# Patient Record
Sex: MALE | Race: BLACK OR AFRICAN AMERICAN | NOT HISPANIC OR LATINO | URBAN - METROPOLITAN AREA
[De-identification: names, ages, dates, MRNs, and addresses within clinical notes are randomized per-mention and may not be internally consistent; named-entity substitution may affect disease eponyms.]

---

## 2016-08-18 RX ORDER — IBUPROFEN 200 MG
1 TABLET ORAL
Qty: 21 | Refills: 0
Start: 2016-08-18 | End: 2022-06-20

## 2017-03-31 ENCOUNTER — EMERGENCY (EMERGENCY)
Facility: HOSPITAL | Age: 27
LOS: 1 days | Discharge: LEFT BEFORE DISCHARGE | End: 2017-03-31
Attending: EMERGENCY MEDICINE | Admitting: EMERGENCY MEDICINE
Payer: MEDICAID

## 2017-03-31 VITALS
SYSTOLIC BLOOD PRESSURE: 147 MMHG | DIASTOLIC BLOOD PRESSURE: 104 MMHG | OXYGEN SATURATION: 98 % | TEMPERATURE: 98 F | HEART RATE: 92 BPM | RESPIRATION RATE: 17 BRPM

## 2017-03-31 VITALS
DIASTOLIC BLOOD PRESSURE: 109 MMHG | RESPIRATION RATE: 18 BRPM | OXYGEN SATURATION: 100 % | HEART RATE: 100 BPM | SYSTOLIC BLOOD PRESSURE: 153 MMHG | TEMPERATURE: 99 F

## 2017-03-31 LAB
ALBUMIN SERPL ELPH-MCNC: 4.8 G/DL — SIGNIFICANT CHANGE UP (ref 3.3–5)
ALP SERPL-CCNC: 72 U/L — SIGNIFICANT CHANGE UP (ref 40–120)
ALT FLD-CCNC: 33 U/L — SIGNIFICANT CHANGE UP (ref 4–41)
AST SERPL-CCNC: 48 U/L — HIGH (ref 4–40)
BASOPHILS # BLD AUTO: 0.01 K/UL — SIGNIFICANT CHANGE UP (ref 0–0.2)
BASOPHILS NFR BLD AUTO: 0.1 % — SIGNIFICANT CHANGE UP (ref 0–2)
BILIRUB SERPL-MCNC: 0.2 MG/DL — SIGNIFICANT CHANGE UP (ref 0.2–1.2)
BUN SERPL-MCNC: 9 MG/DL — SIGNIFICANT CHANGE UP (ref 7–23)
CALCIUM SERPL-MCNC: 9.5 MG/DL — SIGNIFICANT CHANGE UP (ref 8.4–10.5)
CHLORIDE SERPL-SCNC: 101 MMOL/L — SIGNIFICANT CHANGE UP (ref 98–107)
CO2 SERPL-SCNC: 27 MMOL/L — SIGNIFICANT CHANGE UP (ref 22–31)
CREAT SERPL-MCNC: 0.9 MG/DL — SIGNIFICANT CHANGE UP (ref 0.5–1.3)
EOSINOPHIL # BLD AUTO: 0.06 K/UL — SIGNIFICANT CHANGE UP (ref 0–0.5)
EOSINOPHIL NFR BLD AUTO: 0.8 % — SIGNIFICANT CHANGE UP (ref 0–6)
GLUCOSE SERPL-MCNC: 95 MG/DL — SIGNIFICANT CHANGE UP (ref 70–99)
HCT VFR BLD CALC: 39.9 % — SIGNIFICANT CHANGE UP (ref 39–50)
HGB BLD-MCNC: 12.9 G/DL — LOW (ref 13–17)
IMM GRANULOCYTES NFR BLD AUTO: 0.3 % — SIGNIFICANT CHANGE UP (ref 0–1.5)
LYMPHOCYTES # BLD AUTO: 2.7 K/UL — SIGNIFICANT CHANGE UP (ref 1–3.3)
LYMPHOCYTES # BLD AUTO: 37.7 % — SIGNIFICANT CHANGE UP (ref 13–44)
MCHC RBC-ENTMCNC: 25.2 PG — LOW (ref 27–34)
MCHC RBC-ENTMCNC: 32.3 % — SIGNIFICANT CHANGE UP (ref 32–36)
MCV RBC AUTO: 78.1 FL — LOW (ref 80–100)
MONOCYTES # BLD AUTO: 0.32 K/UL — SIGNIFICANT CHANGE UP (ref 0–0.9)
MONOCYTES NFR BLD AUTO: 4.5 % — SIGNIFICANT CHANGE UP (ref 2–14)
NEUTROPHILS # BLD AUTO: 4.05 K/UL — SIGNIFICANT CHANGE UP (ref 1.8–7.4)
NEUTROPHILS NFR BLD AUTO: 56.6 % — SIGNIFICANT CHANGE UP (ref 43–77)
PLATELET # BLD AUTO: 273 K/UL — SIGNIFICANT CHANGE UP (ref 150–400)
PMV BLD: 9.3 FL — SIGNIFICANT CHANGE UP (ref 7–13)
POTASSIUM SERPL-MCNC: 4.2 MMOL/L — SIGNIFICANT CHANGE UP (ref 3.5–5.3)
POTASSIUM SERPL-SCNC: 4.2 MMOL/L — SIGNIFICANT CHANGE UP (ref 3.5–5.3)
PROT SERPL-MCNC: 8.1 G/DL — SIGNIFICANT CHANGE UP (ref 6–8.3)
RBC # BLD: 5.11 M/UL — SIGNIFICANT CHANGE UP (ref 4.2–5.8)
RBC # FLD: 14.1 % — SIGNIFICANT CHANGE UP (ref 10.3–14.5)
SODIUM SERPL-SCNC: 141 MMOL/L — SIGNIFICANT CHANGE UP (ref 135–145)
WBC # BLD: 7.16 K/UL — SIGNIFICANT CHANGE UP (ref 3.8–10.5)
WBC # FLD AUTO: 7.16 K/UL — SIGNIFICANT CHANGE UP (ref 3.8–10.5)

## 2017-03-31 PROCEDURE — 99284 EMERGENCY DEPT VISIT MOD MDM: CPT | Mod: 25

## 2017-03-31 NOTE — ED ADULT TRIAGE NOTE - CHIEF COMPLAINT QUOTE
Pt states he has been experiencing chest pain accompanied by N/V/D for 2 years, states he "cannot take it anymore".  Pt states he drinks "a lot of alcohol 4 times a week", last drink 1130 this AM.

## 2017-03-31 NOTE — ED ADULT NURSE NOTE - OBJECTIVE STATEMENT
pt on bed aox4 ambualtory c/o left sided chest pain radiates to left shoulder, top head abd left mid umbilical area for 2 years on and off worse after drinking alcohol. also reports diarrhea multiple episode, vomiting x1 and nausea denies Dizziness SOB palpitation. claimed drinking alcohol x4 a week on cm sinus rhythm waiting fro DM to eval will monitor

## 2017-04-01 LAB — LIDOCAIN IGE QN: 55.6 U/L — SIGNIFICANT CHANGE UP (ref 7–60)

## 2017-04-01 RX ORDER — METOCLOPRAMIDE HCL 10 MG
10 TABLET ORAL ONCE
Qty: 0 | Refills: 0 | Status: COMPLETED | OUTPATIENT
Start: 2017-04-01 | End: 2017-04-01

## 2017-04-01 RX ORDER — SODIUM CHLORIDE 9 MG/ML
1000 INJECTION INTRAMUSCULAR; INTRAVENOUS; SUBCUTANEOUS ONCE
Qty: 0 | Refills: 0 | Status: COMPLETED | OUTPATIENT
Start: 2017-04-01 | End: 2017-04-01

## 2017-04-01 RX ADMIN — SODIUM CHLORIDE 1000 MILLILITER(S): 9 INJECTION INTRAMUSCULAR; INTRAVENOUS; SUBCUTANEOUS at 00:27

## 2017-04-01 RX ADMIN — Medication 10 MILLIGRAM(S): at 00:27

## 2017-04-01 NOTE — ED PROVIDER NOTE - CARE PLAN
Instructions for follow-up, activity and diet:	You were seen in the Emergency Department for abdominal pain, nausea, vomiting, chest pain and headache. Your symptoms are being evaluated with bloodwork and imaging (radiology studies). You have decided to leave the hospital Against Medical Advice (AMA). This means that your workup and evaluation are not yet complete. In doing so, you are acknowledging that your evaluation is not yet complete and serious medical conditions have not been fully treated or ruled out yet; leaving the hospital before treatment is complete may result in serious illness, worsening or symptoms, requiring return to the hospital, or even death. In leaving at this point in your evaluation, you acknowledge and accept these risks. Please follow up with your primary care doctor and return to the hospital for any new, severe, worsening or concerning symptoms. For assistance with cessation of alcohol use, please consider speaking with a  for additional information regarding resources that may be available to you. Principal Discharge DX:	Abdominal pain  Instructions for follow-up, activity and diet:	You were seen in the Emergency Department for abdominal pain, nausea, vomiting, chest pain and headache. Your symptoms are being evaluated with bloodwork and imaging (radiology studies). You have decided to leave the hospital Against Medical Advice (AMA). This means that your workup and evaluation are not yet complete. In doing so, you are acknowledging that your evaluation is not yet complete and serious medical conditions have not been fully treated or ruled out yet; leaving the hospital before treatment is complete may result in serious illness, worsening or symptoms, requiring return to the hospital, or even death. In leaving at this point in your evaluation, you acknowledge and accept these risks. Please follow up with your primary care doctor and return to the hospital for any new, severe, worsening or concerning symptoms. For assistance with cessation of alcohol use, please consider speaking with a  for additional information regarding resources that may be available to you.

## 2017-04-01 NOTE — ED PROVIDER NOTE - MEDICAL DECISION MAKING DETAILS
27M with abd pain, NVD, chest pain and headache a/w vomiting x 2 years, now worse x 1-2 weeks with no prior evaluation. Clinically well appearing, denies recent illness. Will obtain cbc, cmp, lipase, CXR, give IVF and reglan and reassess. Will also need monitoring for signs/symptoms of withdrawal.
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
## 2017-04-01 NOTE — ED PROVIDER NOTE - OBJECTIVE STATEMENT
27M PMH EtOH  abuse (endorses drinking several pints per day, hx of withdrawal but no hx of withdrawal seizure or DTs, last drink 11:30am) p/w 2 years epigastric abd pain a/w multiple episodes of dry heaving, nausea, chronic diarrhea. Also notes chest pain a/w vomiting and headache a/w vomiting that sometimes persist after vomiting. Has never been seen by a physician for these symptoms. +cigarettes, denies illicit substance use. Denies fever/chills. 27M PMH EtOH  abuse (endorses drinking several pints per day, hx of withdrawal but no hx of withdrawal seizure or DTs, last drink 11:30am) p/w 2 years epigastric abd pain a/w multiple episodes of dry heaving, nausea, chronic diarrhea. Also notes chest pain a/w vomiting and headache a/w vomiting that sometimes persist after vomiting. Has never been seen by a physician for these symptoms. +cigarettes, denies illicit substance use. Denies fever/chills.  JPATEL: pt anxious, not cooperative initially states he has no chest pain, no abdominal pain, just feels nauseas, wants to leave, able to calm patient down after he went to bathroom, no tremors

## 2017-04-01 NOTE — ED PROVIDER NOTE - ATTENDING CONTRIBUTION TO CARE
I performed a face to face history and physical exam of the patient and discussed their management with the resident. I reviewed the resident's note and agree with the documented findings and plan of care. 26 y/o male with ETOH abuse, no hx of DTs, p/w here with multiple complaints, but now mainly states he is nauseas, no chest pain, mile epigastric pain, no headache, no dizziness, denies cocaine, wants to stop drinking but does not want help, no SI, no HI, 1)EKG 2)CBC, CMP, VBG, Lipase 3)IVF, Anti nausea 4)reassess

## 2017-04-01 NOTE — ED PROVIDER NOTE - PLAN OF CARE
You were seen in the Emergency Department for abdominal pain, nausea, vomiting, chest pain and headache. Your symptoms are being evaluated with bloodwork and imaging (radiology studies). You have decided to leave the hospital Against Medical Advice (AMA). This means that your workup and evaluation are not yet complete. In doing so, you are acknowledging that your evaluation is not yet complete and serious medical conditions have not been fully treated or ruled out yet; leaving the hospital before treatment is complete may result in serious illness, worsening or symptoms, requiring return to the hospital, or even death. In leaving at this point in your evaluation, you acknowledge and accept these risks. Please follow up with your primary care doctor and return to the hospital for any new, severe, worsening or concerning symptoms. For assistance with cessation of alcohol use, please consider speaking with a  for additional information regarding resources that may be available to you.

## 2017-04-01 NOTE — ED PROVIDER NOTE - PROGRESS NOTE DETAILS
The pt is clinically sober, A&Ox3, free from distracting injury. Throughout our interactions in the ED today, the pt has demonstrated concrete thinking/reasoning, has maintained an orderly/reasonable conversation, appears to have intact insight/judgment/reason, a sound mind and therefore in our opinion has capacity now to make decisions. The pt verbalized an understanding of our worries. We have communicated to the patient that the ED evaluation is incomplete & many troublesome conditions haven’t been r/o. We have discussed the need for further ED w/u so we can get more information about the pt’s condition. We have discussed the range of possible dx, potential testing & tx options. Our discussions included the potential outcomes of leaving AMA, including worsening of their condition, becoming permanently disabled/in pain/critically ill, or death. Despite these efforts, we were unable to convince the pt to stay. The pt is refusing any further care and is leaving against medical advice. We have attempted to offer tx/rx/guidance for any dangerous conditions which are most likely and/or dangerous. We have answered all questions and have implored the pt to return ASAP to complete the w/u. Pt eloped prior to being given AMA papers. Did not have IV line removed by RN or MD prior to leaving hospital. Attempted to call patient's cell phone at listed number but patient not picking up. Charge RNPratik made multiple attempts to reach patient at numbers provided, also spoke with PD regarding patient elopement.

## 2019-02-28 ENCOUNTER — EMERGENCY (EMERGENCY)
Facility: HOSPITAL | Age: 29
LOS: 1 days | Discharge: HOME | End: 2019-02-28
Attending: EMERGENCY MEDICINE | Admitting: EMERGENCY MEDICINE
Payer: SELF-PAY

## 2019-02-28 VITALS
SYSTOLIC BLOOD PRESSURE: 130 MMHG | HEART RATE: 104 BPM | OXYGEN SATURATION: 100 % | TEMPERATURE: 98 F | DIASTOLIC BLOOD PRESSURE: 89 MMHG | RESPIRATION RATE: 16 BRPM

## 2019-02-28 PROCEDURE — 99283 EMERGENCY DEPT VISIT LOW MDM: CPT | Mod: 25

## 2019-02-28 PROCEDURE — 99053 MED SERV 10PM-8AM 24 HR FAC: CPT
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
## 2019-03-01 VITALS
DIASTOLIC BLOOD PRESSURE: 77 MMHG | OXYGEN SATURATION: 99 % | HEART RATE: 96 BPM | SYSTOLIC BLOOD PRESSURE: 122 MMHG | RESPIRATION RATE: 17 BRPM | TEMPERATURE: 98 F

## 2019-03-01 NOTE — ED ADULT NURSE NOTE - NSIMPLEMENTINTERV_GEN_ALL_ED
Implemented All Fall Risk Interventions:  [place] to call system. Call bell, personal items and telephone within reach. Instruct patient to call for assistance. Room bathroom lighting operational. Non-slip footwear when patient is off stretcher. Physically safe environment: no spills, clutter or unnecessary equipment. Stretcher in lowest position, wheels locked, appropriate side rails in place. Provide visual cue, wrist band, yellow gown, etc. Monitor gait and stability. Monitor for mental status changes and reorient to person, place, and time. Review medications for side effects contributing to fall risk. Reinforce activity limits and safety measures with patient and family.

## 2019-03-01 NOTE — ED PROVIDER NOTE - PHYSICAL EXAMINATION
AOB. Sleeping, arousable to tactile stimuli. FROM all extremities, no bony ttp.  Full neuro exam lmtd by pt cooperation.

## 2019-03-01 NOTE — ED PROVIDER NOTE - NSFOLLOWUPINSTRUCTIONS_ED_ALL_ED_FT
1) Follow up with your doctor in 1 week.   2) Return to the ER immediately for new or worsening symptoms including thoughts of hurting yourself, other or any issues.   3) drink plenty of fluids.
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
## 2019-03-01 NOTE — ED PROVIDER NOTE - ATTENDING CONTRIBUTION TO CARE
28M PMH etoh abuse (prior charts) p/w initially admitting to etoh and expressing SI to EMS. Pt now sleeping, unable to provide additional info. Vitals wnl, FSG wnl. Exam as above. No evidence of trauma. I observed pt moving all extremities and steady unassisted gait when he first arrived to ED.   ddx: Likely etoh intox. possible SI.   Observe in ED, reassess for sobriety.

## 2019-03-01 NOTE — ED PROVIDER NOTE - CLINICAL SUMMARY MEDICAL DECISION MAKING FREE TEXT BOX
29yo M unknown pmhx BIBEMS for SI and etoh intox, will get finger stick, monitor and reassess for SI when clinically sober

## 2019-03-01 NOTE — ED PROVIDER NOTE - PROGRESS NOTE DETAILS
Pt. more awake now, AOx3, denying SI. States what he said last night was taken out of context. Requesting food. [place]ary: Pt went back to sleep, still clinically intoxicated but improving appropriately, will reassess. Huber WILLIS - patient seen and reassessed. walking around the ER. Appears well. When asked about SI or HI, he denies saying he was just drinking. Refuses that he was at all suicidal or wanted to hurt himself. Denies HI. His friend is on the way to pick him up and he will be going home. [place]: Pt awake and alert. Asymptomatic. Denies SI/HI. Steady unassisted gait. Tolerating PO. Clinically sober and not withdrawing, given copy of results, comfortable for dc, outpt PMD f/u.   Plans on getting ride or taking cab home.

## 2019-09-25 ENCOUNTER — EMERGENCY (EMERGENCY)
Facility: HOSPITAL | Age: 29
LOS: 1 days | Discharge: HOME | End: 2019-09-25
Attending: EMERGENCY MEDICINE
Payer: MEDICAID

## 2019-09-25 VITALS
TEMPERATURE: 98 F | HEART RATE: 93 BPM | DIASTOLIC BLOOD PRESSURE: 90 MMHG | SYSTOLIC BLOOD PRESSURE: 135 MMHG | RESPIRATION RATE: 18 BRPM | OXYGEN SATURATION: 98 %

## 2019-09-25 VITALS
TEMPERATURE: 98 F | DIASTOLIC BLOOD PRESSURE: 87 MMHG | OXYGEN SATURATION: 98 % | RESPIRATION RATE: 19 BRPM | HEART RATE: 107 BPM | HEIGHT: 68 IN | WEIGHT: 179.9 LBS | SYSTOLIC BLOOD PRESSURE: 135 MMHG

## 2019-09-25 PROCEDURE — 71046 X-RAY EXAM CHEST 2 VIEWS: CPT | Mod: 26

## 2019-09-25 PROCEDURE — 70450 CT HEAD/BRAIN W/O DYE: CPT | Mod: 26

## 2019-09-25 PROCEDURE — 99284 EMERGENCY DEPT VISIT MOD MDM: CPT

## 2019-09-25 PROCEDURE — 70450 CT HEAD/BRAIN W/O DYE: CPT

## 2019-09-25 PROCEDURE — 73080 X-RAY EXAM OF ELBOW: CPT | Mod: 26,RT

## 2019-09-25 PROCEDURE — 71046 X-RAY EXAM CHEST 2 VIEWS: CPT

## 2019-09-25 PROCEDURE — 73080 X-RAY EXAM OF ELBOW: CPT

## 2019-09-25 RX ORDER — ONDANSETRON 8 MG/1
4 TABLET, FILM COATED ORAL ONCE
Refills: 0 | Status: DISCONTINUED | OUTPATIENT
Start: 2019-09-25 | End: 2019-09-25

## 2019-09-25 RX ORDER — METOCLOPRAMIDE HCL 10 MG
10 TABLET ORAL ONCE
Refills: 0 | Status: DISCONTINUED | OUTPATIENT
Start: 2019-09-25 | End: 2019-09-25

## 2019-09-25 RX ORDER — ONDANSETRON 8 MG/1
1 TABLET, FILM COATED ORAL
Qty: 9 | Refills: 0
Start: 2019-09-25 | End: 2019-09-27

## 2019-09-25 RX ORDER — ONDANSETRON 8 MG/1
4 TABLET, FILM COATED ORAL ONCE
Refills: 0 | Status: COMPLETED | OUTPATIENT
Start: 2019-09-25 | End: 2019-09-25

## 2019-09-25 RX ORDER — IBUPROFEN 200 MG
600 TABLET ORAL ONCE
Refills: 0 | Status: COMPLETED | OUTPATIENT
Start: 2019-09-25 | End: 2019-09-25

## 2019-09-25 RX ADMIN — ONDANSETRON 4 MILLIGRAM(S): 8 TABLET, FILM COATED ORAL at 04:36

## 2019-09-25 RX ADMIN — Medication 600 MILLIGRAM(S): at 04:36
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
## 2019-09-25 NOTE — ED ADULT NURSE NOTE - NSIMPLEMENTINTERV_GEN_ALL_ED
Implemented All Universal Safety Interventions:  [place] to call system. Call bell, personal items and telephone within reach. Instruct patient to call for assistance. Room bathroom lighting operational. Non-slip footwear when patient is off stretcher. Physically safe environment: no spills, clutter or unnecessary equipment. Stretcher in lowest position, wheels locked, appropriate side rails in place.
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
## 2019-09-25 NOTE — ED PROVIDER NOTE - PATIENT PORTAL LINK FT
You can access the FollowMyHealth Patient Portal offered by [facility] by registering at the following website: [email or web address]. By joining [employer]’s FollowMyHealth portal, you will also be able to view your health information using other applications (apps) compatible with our system.

## 2019-09-25 NOTE — ED PROVIDER NOTE - PHYSICAL EXAMINATION
Attending Gladys Yadav: Gen: NAD, heent: atrauamtic, eomi, perrla, mmm, op pink, uvula midline, neck; nttp, no nuchal rigidity, chest: nttp, no crepitus, cv: rrr, no murmurs, lungs: ctab, abd: soft, nontender, nondistended, no peritoneal signs, +BS, no guarding, ext: wwp, neg homans, skin: no rash, neuro: awake and alert, following commands, speech clear, sensation and strength intact, no focal deficits
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
## 2019-09-25 NOTE — ED PROVIDER NOTE - PROGRESS NOTE DETAILS
Attending Gladys Yadav: ct head negative for acute pathology. pt given concussion precautions. no sinus ttp or uri symptoms. pt given compies of all results

## 2019-09-25 NOTE — ED PROVIDER NOTE - ATTENDING CONTRIBUTION TO CARE
Attending MD Gladys Yadav:  I personally have seen and examined this patient.  Resident note reviewed and agree on plan of care and except where noted.  See HPI, PE, and MDM for details.

## 2020-01-04 ENCOUNTER — EMERGENCY (EMERGENCY)
Facility: HOSPITAL | Age: 30
LOS: 0 days | Discharge: LEFT BEFORE DISCHARGE | End: 2020-01-05
Attending: EMERGENCY MEDICINE
Payer: MEDICAID

## 2020-01-04 VITALS
HEIGHT: 65 IN | TEMPERATURE: 98 F | RESPIRATION RATE: 20 BRPM | DIASTOLIC BLOOD PRESSURE: 107 MMHG | HEART RATE: 109 BPM | SYSTOLIC BLOOD PRESSURE: 142 MMHG | WEIGHT: 139.99 LBS | OXYGEN SATURATION: 97 %
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
## 2020-01-04 DIAGNOSIS — F10.129 ALCOHOL ABUSE WITH INTOXICATION, UNSPECIFIED: ICD-10-CM

## 2020-01-04 DIAGNOSIS — S00.93XA CONTUSION OF UNSPECIFIED PART OF HEAD, INITIAL ENCOUNTER: ICD-10-CM
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
## 2020-01-04 PROCEDURE — 99284 EMERGENCY DEPT VISIT MOD MDM: CPT
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
## 2020-01-04 NOTE — ED PROVIDER NOTE - CARE PLAN
Principal Discharge DX:	Alcoholic intoxication without complication  Secondary Diagnosis:	Head contusion

## 2020-01-04 NOTE — ED PROVIDER NOTE - PHYSICAL EXAMINATION
Gen: Sleepy, poor hygiene, smells of alcohol  Head: NC, +contusion and superficial abrasion of left brow, PERRL, EOMI, normal lids/conjunctiva  ENT: normal hearing, patent oropharynx without erythema/exudate, uvula midline  Neck: +supple, +Trachea midline  Pulm: Bilateral BS, normal resp effort, no wheeze/stridor/retractions  CV: RRR, no M/R/G, +dist pulses  Abd: soft, NT/ND  Mskel: no edema/erythema/cyanosis  Skin: no rash, warm/dry  Neuro: AAOx3, slightly unsteady gait

## 2020-01-04 NOTE — ED PROVIDER NOTE - CLINICAL SUMMARY MEDICAL DECISION MAKING FREE TEXT BOX
Patient with alcohol intoxication, with head injury.  VSS.  Ct head and neck without acute pathology.  Patient seen walking with steady gait in ER, refused labs.  Patient eloped from ER, was not seen leaving.
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
## 2020-01-05 PROCEDURE — 76377 3D RENDER W/INTRP POSTPROCES: CPT | Mod: 26

## 2020-01-05 PROCEDURE — 70450 CT HEAD/BRAIN W/O DYE: CPT | Mod: 26

## 2020-01-05 PROCEDURE — 72125 CT NECK SPINE W/O DYE: CPT | Mod: 26

## 2020-01-05 NOTE — ED ADULT NURSE REASSESSMENT NOTE - NS ED NURSE REASSESS COMMENT FT1
pt brought back to unit by ED tech from CT scan. pt is not at bedside and nowhere to be found. security Jin, and Dr. Coreas made aware. this RN never saw pt and did not draw labs on pt as of yet

## 2020-01-05 NOTE — ED ADULT NURSE NOTE - ED STAT RN HANDOFF DETAILS
Report received from TENA Mason at 12am. Assessment available on KB. will continue to monitor. pt off unit at radiology. will continue to monitor/assess/treat upon return to unit

## 2022-03-22 ENCOUNTER — EMERGENCY (EMERGENCY)
Facility: HOSPITAL | Age: 32
LOS: 1 days | Discharge: HOME | End: 2022-03-22
Attending: EMERGENCY MEDICINE | Admitting: EMERGENCY MEDICINE
Payer: MEDICAID

## 2022-03-22 VITALS
TEMPERATURE: 98 F | HEIGHT: 65 IN | DIASTOLIC BLOOD PRESSURE: 95 MMHG | HEART RATE: 86 BPM | OXYGEN SATURATION: 100 % | RESPIRATION RATE: 18 BRPM | SYSTOLIC BLOOD PRESSURE: 132 MMHG

## 2022-03-22 VITALS
DIASTOLIC BLOOD PRESSURE: 99 MMHG | RESPIRATION RATE: 16 BRPM | SYSTOLIC BLOOD PRESSURE: 148 MMHG | OXYGEN SATURATION: 100 % | TEMPERATURE: 98 F | HEART RATE: 90 BPM

## 2022-03-22 LAB
ALBUMIN SERPL ELPH-MCNC: 4.9 G/DL — SIGNIFICANT CHANGE UP (ref 3.3–5)
ALP SERPL-CCNC: 87 U/L — SIGNIFICANT CHANGE UP (ref 40–120)
ALT FLD-CCNC: 32 U/L — SIGNIFICANT CHANGE UP (ref 4–41)
ANION GAP SERPL CALC-SCNC: 19 MMOL/L — HIGH (ref 7–14)
AST SERPL-CCNC: 32 U/L — SIGNIFICANT CHANGE UP (ref 4–40)
BASE EXCESS BLDV CALC-SCNC: 0.9 MMOL/L — SIGNIFICANT CHANGE UP (ref -2–3)
BASE EXCESS BLDV CALC-SCNC: 4.5 MMOL/L — HIGH (ref -2–3)
BASOPHILS # BLD AUTO: 0.02 K/UL — SIGNIFICANT CHANGE UP (ref 0–0.2)
BASOPHILS NFR BLD AUTO: 0.3 % — SIGNIFICANT CHANGE UP (ref 0–2)
BILIRUB SERPL-MCNC: 0.5 MG/DL — SIGNIFICANT CHANGE UP (ref 0.2–1.2)
BLOOD GAS VENOUS COMPREHENSIVE RESULT: SIGNIFICANT CHANGE UP
BLOOD GAS VENOUS COMPREHENSIVE RESULT: SIGNIFICANT CHANGE UP
BUN SERPL-MCNC: 8 MG/DL — SIGNIFICANT CHANGE UP (ref 7–23)
CALCIUM SERPL-MCNC: 9.5 MG/DL — SIGNIFICANT CHANGE UP (ref 8.4–10.5)
CHLORIDE BLDV-SCNC: 103 MMOL/L — SIGNIFICANT CHANGE UP (ref 96–108)
CHLORIDE BLDV-SCNC: 104 MMOL/L — SIGNIFICANT CHANGE UP (ref 96–108)
CHLORIDE SERPL-SCNC: 106 MMOL/L — SIGNIFICANT CHANGE UP (ref 98–107)
CO2 BLDV-SCNC: 26.4 MMOL/L — HIGH (ref 22–26)
CO2 BLDV-SCNC: 30.5 MMOL/L — HIGH (ref 22–26)
CO2 SERPL-SCNC: 22 MMOL/L — SIGNIFICANT CHANGE UP (ref 22–31)
CREAT SERPL-MCNC: 0.69 MG/DL — SIGNIFICANT CHANGE UP (ref 0.5–1.3)
EGFR: 125 ML/MIN/1.73M2 — SIGNIFICANT CHANGE UP
EOSINOPHIL # BLD AUTO: 0 K/UL — SIGNIFICANT CHANGE UP (ref 0–0.5)
EOSINOPHIL NFR BLD AUTO: 0 % — SIGNIFICANT CHANGE UP (ref 0–6)
GAS PNL BLDV: 138 MMOL/L — SIGNIFICANT CHANGE UP (ref 136–145)
GAS PNL BLDV: 142 MMOL/L — SIGNIFICANT CHANGE UP (ref 136–145)
GAS PNL BLDV: SIGNIFICANT CHANGE UP
GAS PNL BLDV: SIGNIFICANT CHANGE UP
GLUCOSE BLDV-MCNC: 111 MG/DL — HIGH (ref 70–99)
GLUCOSE BLDV-MCNC: 117 MG/DL — HIGH (ref 70–99)
GLUCOSE SERPL-MCNC: 129 MG/DL — HIGH (ref 70–99)
HCO3 BLDV-SCNC: 25 MMOL/L — SIGNIFICANT CHANGE UP (ref 22–29)
HCO3 BLDV-SCNC: 29 MMOL/L — SIGNIFICANT CHANGE UP (ref 22–29)
HCT VFR BLD CALC: 37.1 % — LOW (ref 39–50)
HCT VFR BLDA CALC: 35 % — LOW (ref 39–51)
HCT VFR BLDA CALC: 37 % — LOW (ref 39–51)
HGB BLD CALC-MCNC: 11.6 G/DL — LOW (ref 13–17)
HGB BLD CALC-MCNC: 12.2 G/DL — LOW (ref 13–17)
HGB BLD-MCNC: 12.4 G/DL — LOW (ref 13–17)
IANC: 4.92 K/UL — SIGNIFICANT CHANGE UP (ref 1.5–8.5)
IMM GRANULOCYTES NFR BLD AUTO: 0.6 % — SIGNIFICANT CHANGE UP (ref 0–1.5)
LACTATE BLDV-MCNC: 2 MMOL/L — SIGNIFICANT CHANGE UP (ref 0.5–2)
LACTATE BLDV-MCNC: 3.3 MMOL/L — HIGH (ref 0.5–2)
LIDOCAIN IGE QN: 55 U/L — SIGNIFICANT CHANGE UP (ref 7–60)
LYMPHOCYTES # BLD AUTO: 1.72 K/UL — SIGNIFICANT CHANGE UP (ref 1–3.3)
LYMPHOCYTES # BLD AUTO: 24.4 % — SIGNIFICANT CHANGE UP (ref 13–44)
MCHC RBC-ENTMCNC: 24.8 PG — LOW (ref 27–34)
MCHC RBC-ENTMCNC: 33.4 GM/DL — SIGNIFICANT CHANGE UP (ref 32–36)
MCV RBC AUTO: 74.3 FL — LOW (ref 80–100)
MONOCYTES # BLD AUTO: 0.35 K/UL — SIGNIFICANT CHANGE UP (ref 0–0.9)
MONOCYTES NFR BLD AUTO: 5 % — SIGNIFICANT CHANGE UP (ref 2–14)
NEUTROPHILS # BLD AUTO: 4.92 K/UL — SIGNIFICANT CHANGE UP (ref 1.8–7.4)
NEUTROPHILS NFR BLD AUTO: 69.7 % — SIGNIFICANT CHANGE UP (ref 43–77)
NRBC # BLD: 0 /100 WBCS — SIGNIFICANT CHANGE UP
NRBC # FLD: 0 K/UL — SIGNIFICANT CHANGE UP
PCO2 BLDV: 38 MMHG — LOW (ref 42–55)
PCO2 BLDV: 43 MMHG — SIGNIFICANT CHANGE UP (ref 42–55)
PH BLDV: 7.43 — SIGNIFICANT CHANGE UP (ref 7.32–7.43)
PH BLDV: 7.44 — HIGH (ref 7.32–7.43)
PLATELET # BLD AUTO: 285 K/UL — SIGNIFICANT CHANGE UP (ref 150–400)
PO2 BLDV: 35 MMHG — SIGNIFICANT CHANGE UP
PO2 BLDV: 65 MMHG — SIGNIFICANT CHANGE UP
POTASSIUM BLDV-SCNC: 3.7 MMOL/L — SIGNIFICANT CHANGE UP (ref 3.5–5.1)
POTASSIUM BLDV-SCNC: 3.8 MMOL/L — SIGNIFICANT CHANGE UP (ref 3.5–5.1)
POTASSIUM SERPL-MCNC: 3.8 MMOL/L — SIGNIFICANT CHANGE UP (ref 3.5–5.3)
POTASSIUM SERPL-SCNC: 3.8 MMOL/L — SIGNIFICANT CHANGE UP (ref 3.5–5.3)
PROT SERPL-MCNC: 8.2 G/DL — SIGNIFICANT CHANGE UP (ref 6–8.3)
RBC # BLD: 4.99 M/UL — SIGNIFICANT CHANGE UP (ref 4.2–5.8)
RBC # FLD: 15.9 % — HIGH (ref 10.3–14.5)
SAO2 % BLDV: 55.6 % — SIGNIFICANT CHANGE UP
SAO2 % BLDV: 92.1 % — SIGNIFICANT CHANGE UP
SODIUM SERPL-SCNC: 147 MMOL/L — HIGH (ref 135–145)
WBC # BLD: 7.05 K/UL — SIGNIFICANT CHANGE UP (ref 3.8–10.5)
WBC # FLD AUTO: 7.05 K/UL — SIGNIFICANT CHANGE UP (ref 3.8–10.5)

## 2022-03-22 PROCEDURE — 71045 X-RAY EXAM CHEST 1 VIEW: CPT | Mod: 26

## 2022-03-22 PROCEDURE — 99285 EMERGENCY DEPT VISIT HI MDM: CPT

## 2022-03-22 PROCEDURE — 74177 CT ABD & PELVIS W/CONTRAST: CPT | Mod: 26,MA

## 2022-03-22 RX ORDER — ACETAMINOPHEN 500 MG
1000 TABLET ORAL ONCE
Refills: 0 | Status: COMPLETED | OUTPATIENT
Start: 2022-03-22 | End: 2022-03-22

## 2022-03-22 RX ORDER — ONDANSETRON 8 MG/1
4 TABLET, FILM COATED ORAL ONCE
Refills: 0 | Status: COMPLETED | OUTPATIENT
Start: 2022-03-22 | End: 2022-03-22

## 2022-03-22 RX ORDER — SODIUM CHLORIDE 9 MG/ML
1000 INJECTION INTRAMUSCULAR; INTRAVENOUS; SUBCUTANEOUS ONCE
Refills: 0 | Status: COMPLETED | OUTPATIENT
Start: 2022-03-22 | End: 2022-03-22

## 2022-03-22 RX ORDER — ONDANSETRON 8 MG/1
1 TABLET, FILM COATED ORAL
Qty: 9 | Refills: 0
Start: 2022-03-22 | End: 2022-03-24

## 2022-03-22 RX ORDER — ONDANSETRON 8 MG/1
1 TABLET, FILM COATED ORAL
Qty: 9 | Refills: 0
Start: 2022-03-22 | End: 2022-06-16

## 2022-03-22 RX ORDER — FAMOTIDINE 10 MG/ML
20 INJECTION INTRAVENOUS ONCE
Refills: 0 | Status: COMPLETED | OUTPATIENT
Start: 2022-03-22 | End: 2022-03-22

## 2022-03-22 RX ADMIN — ONDANSETRON 4 MILLIGRAM(S): 8 TABLET, FILM COATED ORAL at 11:54

## 2022-03-22 RX ADMIN — Medication 30 MILLILITER(S): at 11:54

## 2022-03-22 RX ADMIN — ONDANSETRON 4 MILLIGRAM(S): 8 TABLET, FILM COATED ORAL at 15:44

## 2022-03-22 RX ADMIN — FAMOTIDINE 20 MILLIGRAM(S): 10 INJECTION INTRAVENOUS at 11:55

## 2022-03-22 RX ADMIN — SODIUM CHLORIDE 1000 MILLILITER(S): 9 INJECTION INTRAMUSCULAR; INTRAVENOUS; SUBCUTANEOUS at 11:55

## 2022-03-22 RX ADMIN — Medication 400 MILLIGRAM(S): at 11:54

## 2022-03-22 NOTE — ED PROVIDER NOTE - NSICDXPASTSURGICALHX_GEN_ALL_CORE_FT
PAST SURGICAL HISTORY:  No significant past surgical history     No significant past surgical history      Negative

## 2022-03-22 NOTE — ED PROVIDER NOTE - NSFOLLOWUPINSTRUCTIONS_ED_ALL_ED_FT
You came to the Emergency Room because of nausea, vomiting, abdominal pain.     Your CT scan is negative for any acute abdominal pathology. Your symptoms may be due to drinking alcohol. You are strongly recommended to refrain from excessive drinking.     You may take the prescribed medication Zofran 4 mg every 8 hours as needed for your nausea, vomiting.     Please follow up with your Primary Medical Doctor within the next 7 days to monitor the progression of your symptoms.     Please come back to the Emergency Room if your symptoms get worse.

## 2022-03-22 NOTE — ED PROVIDER NOTE - PATIENT PORTAL LINK FT
You can access the FollowMyHealth Patient Portal offered by [facility] by registering at the following website: [email or web address]. By joining [employer]’s FollowMyHealth portal, you will also be able to view your health information using other applications (apps) compatible with our system.

## 2022-03-22 NOTE — ED PROVIDER NOTE - CLINICAL SUMMARY MEDICAL DECISION MAKING FREE TEXT BOX
32 y M, hx of significant alcohol use, presenting with 1-day onset of nausea, vomiting, diffuse abdominal pain. Last drink 3 days ago. VSWNL on presentation. PE significant for patient writhing in pain, diffuse abdominal tenderness on palpation with moderate distension. DDx include alcohol gastritis vs bowel/ulcer perforation vs nena hurtado tear. Will get basic lab works, CXR, CT abdomen to rule out acute abdominal pathology. Will manage symptoms with fluid, antiemetics, pepcid, maalox.

## 2022-03-22 NOTE — ED ADULT TRIAGE NOTE - CHIEF COMPLAINT QUOTE
Pt c/o vomiting since 0630h, diarrhea yesterday, abd pain, and HA.  Denies fever.  Says he has blood in the stool

## 2022-03-22 NOTE — ED PROVIDER NOTE - PROGRESS NOTE DETAILS
Patient reassessed. Reports symptomatic improvement. CTAP negative for acute abdominal pathology. Passed PO challenge with maalox and cranberry juice. Patient to be discharged with PO antiemetics and return precautions.

## 2022-03-22 NOTE — ED ADULT NURSE NOTE - OBJECTIVE STATEMENT
Pt A/Ox4 states he started feeling abdominal pain this morning, +vomiting denies diarrhea, states he drinks a lot, last drink was 3 days ago, no withdrawal like sx noted. Pt appears uncomfortable, fidgeting in stretcher, moaning and grimacing in pain. PIV inserted with aseptic technique, blood drawn, labeled at bedside with 2 pt identifiers and sent to lab. Pt and family aware of POC, NAD. Will continue to monitor.

## 2022-03-22 NOTE — SBIRT NOTE ADULT - NSSBIRTALCPOSREINDET_GEN_A_CORE
Provided SBIRT services: Full screen Negative. Patient is currently within healthy guidelines as per pt's AUDIT results. Education materials reviewed and given to patient.

## 2022-03-22 NOTE — ED PROVIDER NOTE - NSICDXPASTMEDICALHX_GEN_ALL_CORE_FT
Patient is scheduled.   PAST MEDICAL HISTORY:  No pertinent past medical history     No pertinent past medical history

## 2022-03-22 NOTE — ED PROVIDER NOTE - PHYSICAL EXAMINATION
Gen: Patient is in moderate distress due to pain, AAOx3, able to ambulate without assistance  HEENT: NCAT, normal conjunctiva, tongue midline, oral mucosa moist  Lung: CTAB, no respiratory distress, no wheezes/rhonchi/rales B/L, speaking in full sentences  CV: RRR, no murmurs, rubs or gallops, distal pulses 2+ b/l  Abd: soft, diffuse tenderness on palpation, moderate distension, no guarding, no rigidity, no rebound tenderness, no CVA tenderness   MSK: no visible deformities, ROM normal in UE/LE, no back TTP  Neuro: No focal sensory or motor deficits  Skin: Warm, well perfused, no leg swelling  Psych: normal affect, calm

## 2022-03-22 NOTE — ED PROVIDER NOTE - ATTENDING CONTRIBUTION TO CARE
Dr. Wall: 31 yo male with PMH significant EtOH use, last drink 3 days ago, in ED with 1 day of diffuse abdominal pain and N/V beginning early this morning, with inability to tolerate PO.  No blood in vomit or stool.  No tremor or feelings of withdrawal.  On exam pt uncomfortable appearing, in mild distress due to pain, heart RRR, lungs CTAB, abd mild generalized TTP without rebound or guarding, extremities without swelling, strength 5/5 in all extremities and skin without rash.

## 2022-06-14 ENCOUNTER — EMERGENCY (EMERGENCY)
Facility: HOSPITAL | Age: 32
LOS: 1 days | Discharge: HOME | End: 2022-06-14
Attending: EMERGENCY MEDICINE | Admitting: EMERGENCY MEDICINE
Payer: MEDICAID

## 2022-06-14 VITALS
TEMPERATURE: 98 F | SYSTOLIC BLOOD PRESSURE: 126 MMHG | HEART RATE: 62 BPM | RESPIRATION RATE: 18 BRPM | OXYGEN SATURATION: 100 % | DIASTOLIC BLOOD PRESSURE: 92 MMHG

## 2022-06-14 VITALS
TEMPERATURE: 97 F | DIASTOLIC BLOOD PRESSURE: 90 MMHG | HEART RATE: 73 BPM | HEIGHT: 65 IN | OXYGEN SATURATION: 100 % | SYSTOLIC BLOOD PRESSURE: 125 MMHG | RESPIRATION RATE: 20 BRPM

## 2022-06-14 LAB
ALBUMIN SERPL ELPH-MCNC: 5 G/DL — SIGNIFICANT CHANGE UP (ref 3.3–5)
ALP SERPL-CCNC: 78 U/L — SIGNIFICANT CHANGE UP (ref 40–120)
ALT FLD-CCNC: 38 U/L — SIGNIFICANT CHANGE UP (ref 4–41)
ANION GAP SERPL CALC-SCNC: 16 MMOL/L — HIGH (ref 7–14)
AST SERPL-CCNC: 39 U/L — SIGNIFICANT CHANGE UP (ref 4–40)
BASOPHILS # BLD AUTO: 0.02 K/UL — SIGNIFICANT CHANGE UP (ref 0–0.2)
BASOPHILS NFR BLD AUTO: 0.3 % — SIGNIFICANT CHANGE UP (ref 0–2)
BILIRUB SERPL-MCNC: 0.3 MG/DL — SIGNIFICANT CHANGE UP (ref 0.2–1.2)
BUN SERPL-MCNC: 7 MG/DL — SIGNIFICANT CHANGE UP (ref 7–23)
CALCIUM SERPL-MCNC: 9.3 MG/DL — SIGNIFICANT CHANGE UP (ref 8.4–10.5)
CHLORIDE SERPL-SCNC: 103 MMOL/L — SIGNIFICANT CHANGE UP (ref 98–107)
CO2 SERPL-SCNC: 24 MMOL/L — SIGNIFICANT CHANGE UP (ref 22–31)
CREAT SERPL-MCNC: 0.67 MG/DL — SIGNIFICANT CHANGE UP (ref 0.5–1.3)
EGFR: 126 ML/MIN/1.73M2 — SIGNIFICANT CHANGE UP
EOSINOPHIL # BLD AUTO: 0.01 K/UL — SIGNIFICANT CHANGE UP (ref 0–0.5)
EOSINOPHIL NFR BLD AUTO: 0.1 % — SIGNIFICANT CHANGE UP (ref 0–6)
ETHANOL SERPL-MCNC: <10 MG/DL — SIGNIFICANT CHANGE UP
GLUCOSE SERPL-MCNC: 100 MG/DL — HIGH (ref 70–99)
HCT VFR BLD CALC: 37.2 % — LOW (ref 39–50)
HGB BLD-MCNC: 12.2 G/DL — LOW (ref 13–17)
IANC: 4.81 K/UL — SIGNIFICANT CHANGE UP (ref 1.8–7.4)
IMM GRANULOCYTES NFR BLD AUTO: 0.3 % — SIGNIFICANT CHANGE UP (ref 0–1.5)
LIDOCAIN IGE QN: 30 U/L — SIGNIFICANT CHANGE UP (ref 7–60)
LYMPHOCYTES # BLD AUTO: 1.89 K/UL — SIGNIFICANT CHANGE UP (ref 1–3.3)
LYMPHOCYTES # BLD AUTO: 26.3 % — SIGNIFICANT CHANGE UP (ref 13–44)
MCHC RBC-ENTMCNC: 26.4 PG — LOW (ref 27–34)
MCHC RBC-ENTMCNC: 32.8 GM/DL — SIGNIFICANT CHANGE UP (ref 32–36)
MCV RBC AUTO: 80.5 FL — SIGNIFICANT CHANGE UP (ref 80–100)
MONOCYTES # BLD AUTO: 0.43 K/UL — SIGNIFICANT CHANGE UP (ref 0–0.9)
MONOCYTES NFR BLD AUTO: 6 % — SIGNIFICANT CHANGE UP (ref 2–14)
NEUTROPHILS # BLD AUTO: 4.81 K/UL — SIGNIFICANT CHANGE UP (ref 1.8–7.4)
NEUTROPHILS NFR BLD AUTO: 67 % — SIGNIFICANT CHANGE UP (ref 43–77)
NRBC # BLD: 0 /100 WBCS — SIGNIFICANT CHANGE UP
NRBC # FLD: 0 K/UL — SIGNIFICANT CHANGE UP
PLATELET # BLD AUTO: 301 K/UL — SIGNIFICANT CHANGE UP (ref 150–400)
POTASSIUM SERPL-MCNC: 3.7 MMOL/L — SIGNIFICANT CHANGE UP (ref 3.5–5.3)
POTASSIUM SERPL-SCNC: 3.7 MMOL/L — SIGNIFICANT CHANGE UP (ref 3.5–5.3)
PROT SERPL-MCNC: 7.9 G/DL — SIGNIFICANT CHANGE UP (ref 6–8.3)
RBC # BLD: 4.62 M/UL — SIGNIFICANT CHANGE UP (ref 4.2–5.8)
RBC # FLD: 14.5 % — SIGNIFICANT CHANGE UP (ref 10.3–14.5)
SODIUM SERPL-SCNC: 143 MMOL/L — SIGNIFICANT CHANGE UP (ref 135–145)
WBC # BLD: 7.18 K/UL — SIGNIFICANT CHANGE UP (ref 3.8–10.5)
WBC # FLD AUTO: 7.18 K/UL — SIGNIFICANT CHANGE UP (ref 3.8–10.5)

## 2022-06-14 PROCEDURE — 73562 X-RAY EXAM OF KNEE 3: CPT | Mod: 26,RT

## 2022-06-14 PROCEDURE — 99284 EMERGENCY DEPT VISIT MOD MDM: CPT

## 2022-06-14 RX ORDER — FAMOTIDINE 10 MG/ML
2 INJECTION INTRAVENOUS
Qty: 28 | Refills: 0
Start: 2022-06-14 | End: 2022-06-20

## 2022-06-14 RX ORDER — FAMOTIDINE 10 MG/ML
20 INJECTION INTRAVENOUS ONCE
Refills: 0 | Status: COMPLETED | OUTPATIENT
Start: 2022-06-14 | End: 2022-06-14

## 2022-06-14 RX ORDER — METOCLOPRAMIDE HCL 10 MG
10 TABLET ORAL ONCE
Refills: 0 | Status: COMPLETED | OUTPATIENT
Start: 2022-06-14 | End: 2022-06-14

## 2022-06-14 RX ORDER — SODIUM CHLORIDE 9 MG/ML
1000 INJECTION INTRAMUSCULAR; INTRAVENOUS; SUBCUTANEOUS ONCE
Refills: 0 | Status: COMPLETED | OUTPATIENT
Start: 2022-06-14 | End: 2022-06-14

## 2022-06-14 RX ORDER — KETOROLAC TROMETHAMINE 30 MG/ML
30 SYRINGE (ML) INJECTION ONCE
Refills: 0 | Status: DISCONTINUED | OUTPATIENT
Start: 2022-06-14 | End: 2022-06-14

## 2022-06-14 RX ADMIN — Medication 10 MILLIGRAM(S): at 15:14

## 2022-06-14 RX ADMIN — Medication 30 MILLIGRAM(S): at 15:14

## 2022-06-14 RX ADMIN — FAMOTIDINE 20 MILLIGRAM(S): 10 INJECTION INTRAVENOUS at 15:15

## 2022-06-14 RX ADMIN — SODIUM CHLORIDE 1000 MILLILITER(S): 9 INJECTION INTRAMUSCULAR; INTRAVENOUS; SUBCUTANEOUS at 15:13

## 2022-06-14 NOTE — SBIRT NOTE ADULT - NSSBIRTBRIEFINTDET_GEN_A_CORE
Provided SBIRT services: Full screen positive. Brief Intervention Performed. Screening results were reviewed with the patient and patient was provided information about healthy guidelines and potential negative consequences associated with level of risk.
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
## 2022-06-14 NOTE — ED PROVIDER NOTE - PROGRESS NOTE DETAILS
DATA:  EKG: pending at time of evaluation  LAB: Pending at time of evaluation                        12.2   7.18  )-----------( 301      ( 14 Jun 2022 15:10 )             37.2   Mean Cell Volume: 80.5 fL (06-14-22 @ 15:10)  Auto Neutrophil %: 67.0 % (06-14-22 @ 15:10)  Auto Eosinophil %: 0.1 % (06-14-22 @ 15:10)  06-14    143  |  103  |  7   ----------------------------<  100<H>  3.7   |  24  |  0.67    Ca    9.3      14 Jun 2022 15:10    TPro  7.9  /  Alb  5.0  /  TBili  0.3  /  DBili  x   /  AST  39  /  ALT  38  /  AlkPhos  78  06-14  Lipase, Serum: 30 U/L (06-14-22 @ 15:10)  Lipase, Serum: 30 U/L (06-14-22 @ 15:10)  Rt knee XR IMPRESSION:  No acute fracture or dislocation.     IMPRESSION/RISK:  Dx= chronic knee pain and gastritis     Consideration include will give brace and followup as described   Plan  RTED PRN

## 2022-06-14 NOTE — ED PROVIDER NOTE - MUSCULOSKELETAL, MLM
Spine appears normal, range of motion is not limited, no muscle tenderness; moderate swelling/effusion limitation of motion without redness or

## 2022-06-14 NOTE — ED PROVIDER NOTE - NSFOLLOWUPINSTRUCTIONS_ED_ALL_ED_FT
YOU RECEIVED DISCHARGE INSTRUCTION FOR gastritis  AND esophagitis     Esophagits/Gastritis  WHAT YOU NEED TO KNOW:  Esophagitis gastritis   Esophagitis/Gastritis is inflammation or irritation of the tube leading to your stomach or the lining of the stomach itself.   Abdominal Organs   DISCHARGE INSTRUCTIONS:  Call your doctor if:   •You have new or worsening symptoms, even after treatment.  •You have questions or concerns about your condition or care.  Medicines:   •Medicines may be given to fight an infection or to control stomach acid.  Call 911 for any of the following:   •You develop chest pain or shortness of breath.  Seek care immediately if:   •You vomit blood.  •You have black or bloody bowel movements.  •You have severe stomach or back pain.  Contact your healthcare provider if:   •You have a fever.  •You have new or worsening symptoms, even after treatment.  •You have questions or concerns about your condition or care.  Medicines:   •Medicines may be given to help treat a bacterial infection or decrease stomach acid.   •Take your medicine as directed. Contact your healthcare provider if you think your medicine is not helping or if you have side effects. Tell him or her if you are allergic to any medicine. Keep a list of the medicines, vitamins, and herbs you take. Include the amounts, and when and why you take them. Bring the list or the pill bottles to follow-up visits. Carry your medicine list with you in case of an emergency.  Manage or prevent esophagitis/gastritis:   •Do not smoke. Nicotine and other chemicals in cigarettes and cigars can make your symptoms worse and cause lung damage. Ask your healthcare provider for information if you currently smoke and need help to quit. E-cigarettes or smokeless tobacco still contain nicotine. Talk to your healthcare provider before you use these products.   •Do not drink alcohol. Alcohol can prevent healing and make your gastritis worse. Talk to your healthcare provider if you need help to stop drinking.  •Do not take NSAIDs or aspirin unless directed. These and similar medicines can cause irritation. If your healthcare provider says it is okay to take NSAIDs, take them with food.   •Do not eat foods that cause irritation. Foods such as oranges and salsa can cause burning or pain. Eat a variety of healthy foods. Examples include fruits (not citrus), vegetables, low-fat dairy products, beans, whole-grain breads, and lean meats and fish. Try to eat small meals, and drink water with your meals. Do not eat for at least 3 hours before you go to bed.  •Limit or do not eat foods that can lead to esophagitis. Foods such as oranges and salsa can irritate your esophagus. Caffeine and chocolate can cause acid reflux. High-fat and fried foods make your stomach digest food more slowly. This increases the amount of stomach acid your esophagus is exposed to. Eat small meals, and drink water with your meals. Soft foods such as yogurt and applesauce may help soothe your throat. Do not eat for at least 3 hours before you go to bed.  •Drink more liquid when you take pills. Drink a full glass of water when you take your pills. Ask your healthcare provider if you can take your pills at least an hour before you go to bed.  •Prevent acid reflux. Do not bend over unless it is necessary. Acid may back up into your esophagus when you bend over. If possible, keep the head of your bed elevated while you sleep. This will help keep acid from backing up. Manage stress. Stress can make your symptoms worse or cause stomach acid to back up.  •Keep batteries and similar objects out of the reach of children. Babies often put items in their mouths to explore them. Button batteries are easy to swallow and can cause serious damage. Keep the battery covers of electronic devices such as remote controls taped closed. Store all batteries and toxic materials where children cannot get to them. Use childproof locks to keep children away from dangerous materials.  •Find ways to relax and decrease stress. Stress can increase stomach acid and make gastritis worse. Activities such as yoga, meditation, or listening to music can help you relax. Spend time with friends, or do things you enjoy.  Follow up with your healthcare provider as directed: You may need ongoing tests or treatment, or referral to a gastroenterologist. Write down your questions so you remember to ask them during your visits.  Call your local emergency number (911 in the [place]) for any of the following:   •You have any of the following signs of a heart attack: ?Squeezing, pressure, or pain in your chest  ?You may also have any of the following: ?Discomfort or pain in your back, neck, jaw, stomach, or arm  ?Shortness of breath  ?Nausea or vomiting  ?Lightheadedness or a sudden cold sweat  Seek care immediately if:   •You feel like you have food stuck in your throat and you cannot cough it out.      Knee Pain  WHAT YOU NEED TO KNOW:  Knee pain may start suddenly, or it may be a long-term problem. You may have pain on the side, front, or back of your knee. You may have knee stiffness and swelling. You may hear popping sounds or feel like your knee is giving way or locking up as you walk. You may feel pain when you sit, stand, walk, or climb up and down stairs. Knee pain can be caused by conditions such as obesity, inflammation, or strains or tears in ligaments or tendons.   DISCHARGE INSTRUCTIONS:  Return to the emergency department if:   •Your pain is worse, even after treatment.   •You cannot bend or straighten your leg completely.   •The swelling around your knee does not go down even with treatment.  •Your knee is painful and hot to the touch.   Contact your healthcare provider if:   •You have questions or concerns about your condition or care.   Medicines: You may need any of the following:   •NSAIDs help decrease swelling and pain or fever. This medicine is available with or without a doctor's order. NSAIDs can cause stomach bleeding or kidney problems in certain people. If you take blood thinner medicine, always ask your healthcare provider if NSAIDs are safe for you. Always read the medicine label and follow directions.  •Acetaminophen decreases pain and fever. It is available without a doctor's order. Ask how much to take and how often to take it. Follow directions. Read the labels of all other medicines you are using to see if they also contain acetaminophen, or ask your doctor or pharmacist. Acetaminophen can cause liver damage if not taken correctly. Do not use more than 4 grams (4,000 milligrams) total of acetaminophen in one day.   •Prescription pain medicine may be given. Ask your healthcare provider how to take this medicine safely. Some prescription pain medicines contain acetaminophen. Do not take other medicines that contain acetaminophen without talking to your healthcare provider. Too much acetaminophen may cause liver damage. Prescription pain medicine may cause constipation. Ask your healthcare provider how to prevent or treat constipation.   •Take your medicine as directed. Contact your healthcare provider if you think your medicine is not helping or if you have side effects. Tell him or her if you are allergic to any medicine. Keep a list of the medicines, vitamins, and herbs you take. Include the amounts, and when and why you take them. Bring the list or the pill bottles to follow-up visits. Carry your medicine list with you in case of an emergency.  What you can do to manage your symptoms:   •Rest your knee so it can heal. Limit activities that increase your pain. Do low-impact exercises, such as walking or swimming.   •Apply ice to help reduce swelling and pain. Use an ice pack, or put crushed ice in a plastic bag. Cover it with a towel before you apply it to your knee. Apply ice for 15 to 20 minutes every hour, or as directed.  •Apply compression to help reduce swelling. Use a brace or bandage only as directed.  •Elevate your knee to help decrease pain and swelling. Elevate your knee while you are sitting or lying down. Prop your leg on pillows to keep your knee above the level of your heart.  •Prevent your knee from moving as directed. Your healthcare provider may put on a cast or splint. You may need to wear a leg brace to stabilize your knee. A leg brace can be adjusted to increase your range of motion as your knee heals.  Hinged Knee Braces    What you can do to prevent knee pain:   •Maintain a healthy weight. Extra weight increases your risk for knee pain. Ask your healthcare provider how much you should weigh. He or she can help you create a safe weight loss plan if you need to lose weight.  •Exercise or train properly. Use the correct equipment for sports. Wear shoes that provide good support. Check your posture often as you exercise, play sports, or train for an event. This can help prevent stress and strain on your knees. Rest between sessions so you do not overwork your knees.  Follow up with your healthcare provider within 24 hours or as directed: You may need follow-up treatments, such as steroid injections to decrease pain. Write down your questions so you remember to ask them during your visits. as.

## 2022-06-14 NOTE — ED PROVIDER NOTE - PATIENT PORTAL LINK FT
You can access the FollowMyHealth Patient Portal offered by [facility] by registering at the following website: [email or web address]. By joining [employer]’s FollowMyHealth portal, you will also be able to view your health information using other applications (apps) compatible with our system.

## 2022-06-20 PROBLEM — F10.10 ALCOHOL ABUSE, UNCOMPLICATED: Chronic | Status: ACTIVE | Noted: 2022-06-14

## 2022-06-20 PROBLEM — M25.561 PAIN IN RIGHT KNEE: Chronic | Status: ACTIVE | Noted: 2022-06-14

## 2022-06-20 PROBLEM — K29.20 ALCOHOLIC GASTRITIS WITHOUT BLEEDING: Chronic | Status: ACTIVE | Noted: 2022-06-14

## 2022-07-21 ENCOUNTER — APPOINTMENT (OUTPATIENT)
Dept: GASTROENTEROLOGY | Facility: CLINIC | Age: 32
End: 2022-07-21

## 2022-08-09 ENCOUNTER — EMERGENCY (EMERGENCY)
Facility: HOSPITAL | Age: 32
LOS: 0 days | Discharge: HOME | End: 2022-08-09
Attending: STUDENT IN AN ORGANIZED HEALTH CARE EDUCATION/TRAINING PROGRAM

## 2022-08-09 VITALS
OXYGEN SATURATION: 100 % | TEMPERATURE: 98 F | SYSTOLIC BLOOD PRESSURE: 131 MMHG | DIASTOLIC BLOOD PRESSURE: 89 MMHG | HEART RATE: 79 BPM

## 2022-08-09 VITALS
RESPIRATION RATE: 16 BRPM | WEIGHT: 190.04 LBS | OXYGEN SATURATION: 99 % | HEIGHT: 65 IN | HEART RATE: 110 BPM | SYSTOLIC BLOOD PRESSURE: 142 MMHG | DIASTOLIC BLOOD PRESSURE: 109 MMHG | TEMPERATURE: 99 F

## 2022-08-09 DIAGNOSIS — F10.10 ALCOHOL ABUSE, UNCOMPLICATED: ICD-10-CM

## 2022-08-09 DIAGNOSIS — Z88.1 ALLERGY STATUS TO OTHER ANTIBIOTIC AGENTS STATUS: ICD-10-CM

## 2022-08-09 DIAGNOSIS — T50.3X1A POISONING BY ELECTROLYTIC, CALORIC AND WATER-BALANCE AGENTS, ACCIDENTAL (UNINTENTIONAL), INITIAL ENCOUNTER: ICD-10-CM
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
## 2022-08-09 LAB
AMPHET UR-MCNC: NEGATIVE — SIGNIFICANT CHANGE UP
ANION GAP SERPL CALC-SCNC: 11 MMOL/L — SIGNIFICANT CHANGE UP (ref 5–17)
APAP SERPL-MCNC: < 2 UG/ML (ref 10–30)
BARBITURATES UR SCN-MCNC: NEGATIVE — SIGNIFICANT CHANGE UP
BASOPHILS # BLD AUTO: 0.01 K/UL — SIGNIFICANT CHANGE UP (ref 0–0.2)
BASOPHILS NFR BLD AUTO: 0.1 % — SIGNIFICANT CHANGE UP (ref 0–2)
BENZODIAZ UR-MCNC: NEGATIVE — SIGNIFICANT CHANGE UP
BUN SERPL-MCNC: 7 MG/DL — SIGNIFICANT CHANGE UP (ref 7–23)
CALCIUM SERPL-MCNC: 8.9 MG/DL — SIGNIFICANT CHANGE UP (ref 8.5–10.1)
CHLORIDE SERPL-SCNC: 108 MMOL/L — SIGNIFICANT CHANGE UP (ref 96–108)
CO2 SERPL-SCNC: 23 MMOL/L — SIGNIFICANT CHANGE UP (ref 22–31)
COCAINE METAB.OTHER UR-MCNC: NEGATIVE — SIGNIFICANT CHANGE UP
CREAT SERPL-MCNC: 0.96 MG/DL — SIGNIFICANT CHANGE UP (ref 0.5–1.3)
EGFR: 106 ML/MIN/1.73M2 — SIGNIFICANT CHANGE UP
EOSINOPHIL # BLD AUTO: 0.03 K/UL — SIGNIFICANT CHANGE UP (ref 0–0.5)
EOSINOPHIL NFR BLD AUTO: 0.4 % — SIGNIFICANT CHANGE UP (ref 0–6)
ETHANOL SERPL-MCNC: <10 MG/DL — SIGNIFICANT CHANGE UP (ref 0–10)
GLUCOSE SERPL-MCNC: 106 MG/DL — HIGH (ref 70–99)
HCT VFR BLD CALC: 34.2 % — LOW (ref 39–50)
HGB BLD-MCNC: 11.3 G/DL — LOW (ref 13–17)
IMM GRANULOCYTES NFR BLD AUTO: 0.4 % — SIGNIFICANT CHANGE UP (ref 0–1.5)
LYMPHOCYTES # BLD AUTO: 2.3 K/UL — SIGNIFICANT CHANGE UP (ref 1–3.3)
LYMPHOCYTES # BLD AUTO: 31.3 % — SIGNIFICANT CHANGE UP (ref 13–44)
MCHC RBC-ENTMCNC: 25.8 PG — LOW (ref 27–34)
MCHC RBC-ENTMCNC: 33 G/DL — SIGNIFICANT CHANGE UP (ref 32–36)
MCV RBC AUTO: 78.1 FL — LOW (ref 80–100)
METHADONE UR-MCNC: NEGATIVE — SIGNIFICANT CHANGE UP
MONOCYTES # BLD AUTO: 0.5 K/UL — SIGNIFICANT CHANGE UP (ref 0–0.9)
MONOCYTES NFR BLD AUTO: 6.8 % — SIGNIFICANT CHANGE UP (ref 2–14)
NEUTROPHILS # BLD AUTO: 4.47 K/UL — SIGNIFICANT CHANGE UP (ref 1.8–7.4)
NEUTROPHILS NFR BLD AUTO: 61 % — SIGNIFICANT CHANGE UP (ref 43–77)
NRBC # BLD: 0 /100 WBCS — SIGNIFICANT CHANGE UP (ref 0–0)
OPIATES UR-MCNC: NEGATIVE — SIGNIFICANT CHANGE UP
PCP SPEC-MCNC: SIGNIFICANT CHANGE UP
PCP UR-MCNC: NEGATIVE — SIGNIFICANT CHANGE UP
PLATELET # BLD AUTO: 311 K/UL — SIGNIFICANT CHANGE UP (ref 150–400)
POTASSIUM SERPL-MCNC: 3.4 MMOL/L — LOW (ref 3.5–5.3)
POTASSIUM SERPL-SCNC: 3.4 MMOL/L — LOW (ref 3.5–5.3)
RBC # BLD: 4.38 M/UL — SIGNIFICANT CHANGE UP (ref 4.2–5.8)
RBC # FLD: 15.9 % — HIGH (ref 10.3–14.5)
SALICYLATES SERPL-MCNC: <1.7 MG/DL — LOW (ref 2.8–20)
SODIUM SERPL-SCNC: 142 MMOL/L — SIGNIFICANT CHANGE UP (ref 135–145)
THC UR QL: NEGATIVE — SIGNIFICANT CHANGE UP
WBC # BLD: 7.34 K/UL — SIGNIFICANT CHANGE UP (ref 3.8–10.5)
WBC # FLD AUTO: 7.34 K/UL — SIGNIFICANT CHANGE UP (ref 3.8–10.5)

## 2022-08-09 PROCEDURE — 99285 EMERGENCY DEPT VISIT HI MDM: CPT

## 2022-08-09 PROCEDURE — 93010 ELECTROCARDIOGRAM REPORT: CPT

## 2022-08-09 NOTE — ED PROVIDER NOTE - PATIENT PORTAL LINK FT
You can access the FollowMyHealth Patient Portal offered by [facility] by registering at the following website: [email or web address]. By joining [employer]’s FollowMyHealth portal, you will also be able to view your health information using other applications (apps) compatible with our system.

## 2022-08-09 NOTE — ED ADULT NURSE NOTE - NSIMPLEMENTINTERV_GEN_ALL_ED
ambulatory
Implemented All Fall with Harm Risk Interventions:  [place] to call system. Call bell, personal items and telephone within reach. Instruct patient to call for assistance. Room bathroom lighting operational. Non-slip footwear when patient is off stretcher. Physically safe environment: no spills, clutter or unnecessary equipment. Stretcher in lowest position, wheels locked, appropriate side rails in place. Provide visual cue, wrist band, yellow gown, etc. Monitor gait and stability. Monitor for mental status changes and reorient to person, place, and time. Review medications for side effects contributing to fall risk. Reinforce activity limits and safety measures with patient and family. Provide visual clues: red socks.

## 2022-08-09 NOTE — ED PROVIDER NOTE - CLINICAL SUMMARY MEDICAL DECISION MAKING FREE TEXT BOX
31 yo man, hx of alcohol use disorder presents with possible ingestion of K2. Per EMS, mother was worried patient was pacing and forth and thinks he may have smoked K2. Patient denies K2 and cannabis use. Patient reports he does not know why he is here. Vitals notable for tachycardia, internally preoccupied and staring into the distance. Concern for toxic ingestion. Will obtain psychiatric labs  Will monitor for improvement in mental status, otherwise will obtain CT head. 33 yo man, hx of alcohol use disorder presents with possible ingestion of K2. Per EMS, mother was worried patient was pacing and forth and thinks he may have smoked K2. Patient denies K2 and cannabis use. Patient reports he does not know why he is here. Vitals notable for tachycardia, internally preoccupied and staring into the distance. Concern for toxic ingestion. Will obtain psychiatric labs  Will monitor for improvement in mental status, otherwise will obtain CT head.    Labs unremarkable, patient clinically improving. Patient signed out to incoming provider Dr Mahmood pending metabolism.

## 2022-08-09 NOTE — ED PROVIDER NOTE - PSYCHIATRIC, MLM
Alert and oriented to person, looking out into the distance, internally preoccupied, paranoid with being asked about K2 use or my using of his name

## 2022-08-09 NOTE — ED ADULT TRIAGE NOTE - CHIEF COMPLAINT QUOTE
As per EMS states mom called stating pt pacing back and forth, acting irrational, thinks he smoked K2. Pt restless in traige, not answering questions appropriately.

## 2022-08-09 NOTE — ED ADULT NURSE REASSESSMENT NOTE - NS ED NURSE REASSESS COMMENT FT1
Patient received sleeping awaken with name call, pt denies any discomfort, voiced feeling well and wants to go home, MD Gomes made aware.

## 2022-12-01 ENCOUNTER — EMERGENCY (EMERGENCY)
Facility: HOSPITAL | Age: 32
LOS: 0 days | Discharge: HOME | End: 2022-12-02
Attending: EMERGENCY MEDICINE

## 2022-12-01 VITALS
HEIGHT: 66 IN | TEMPERATURE: 98 F | OXYGEN SATURATION: 98 % | HEART RATE: 113 BPM | RESPIRATION RATE: 20 BRPM | SYSTOLIC BLOOD PRESSURE: 113 MMHG | WEIGHT: 179.9 LBS | DIASTOLIC BLOOD PRESSURE: 76 MMHG
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
## 2022-12-01 DIAGNOSIS — S63.295A: ICD-10-CM

## 2022-12-01 DIAGNOSIS — S61.211A LACERATION WITHOUT FOREIGN BODY OF LEFT INDEX FINGER WITHOUT DAMAGE TO NAIL, INITIAL ENCOUNTER: ICD-10-CM

## 2022-12-01 DIAGNOSIS — Z88.1 ALLERGY STATUS TO OTHER ANTIBIOTIC AGENTS STATUS: ICD-10-CM
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
## 2022-12-01 PROCEDURE — 99284 EMERGENCY DEPT VISIT MOD MDM: CPT | Mod: 25

## 2022-12-01 PROCEDURE — 12001 RPR S/N/AX/GEN/TRNK 2.5CM/<: CPT

## 2022-12-01 RX ORDER — TETANUS TOXOID, REDUCED DIPHTHERIA TOXOID AND ACELLULAR PERTUSSIS VACCINE, ADSORBED 5; 2.5; 8; 8; 2.5 [IU]/.5ML; [IU]/.5ML; UG/.5ML; UG/.5ML; UG/.5ML
0.5 SUSPENSION INTRAMUSCULAR ONCE
Refills: 0 | Status: COMPLETED | OUTPATIENT
Start: 2022-12-01 | End: 2022-12-01

## 2022-12-01 RX ADMIN — Medication 300 MILLIGRAM(S): at 23:44

## 2022-12-01 NOTE — ED PROVIDER NOTE - PATIENT PORTAL LINK FT
You can access the FollowMyHealth Patient Portal offered by [facility] by registering at the following website: [email or web address]. By joining [employer]’s FollowMyHealth portal, you will also be able to view your health information using other applications (apps) compatible with our system.

## 2022-12-01 NOTE — ED PROVIDER NOTE - OBJECTIVE STATEMENT
33 yo M with L 4th digit avulsion.  Finger got caught in slamming door, taking the entire distal digit off.  + bleeding, + localized pain, no other trauma.  Pt. explains door cut off fingertip cleaning, right through the nail bed.  No other complaints.   ROS: negative for fever, cough, headache, chest pain, shortness of breath, abd pain, nausea, vomiting, diarrhea, rash, paresthesia, and weakness--all other systems reviewed are negative.   PMH: negative; Meds: Denies; SH: Denies smoking/drinking/drug use

## 2022-12-01 NOTE — ED ADULT TRIAGE NOTE - CHIEF COMPLAINT QUOTE
avulsion on the left 4th digit finger caught by the door 15 minutes ago. missing tip of the finger noted. moderate bleeding noted.

## 2022-12-01 NOTE — ED PROVIDER NOTE - PROGRESS NOTE DETAILS
pt.'s mother shows to ER with distal fingertip in peroxide, placed in saline, then on ice  spoke with Dr. Ocasio regarding pt. recommends antibiotics and repair with finger tip Results reported to patient--L 4th digit shows distal bony deformity, likely distal phalange fracture/avulsion  Pt. reports feeling better after meds and repair; pt. understands distal finger tip may not take hold and could fall off, but provides benefit of protecting bone/skin  pt. agrees to f/u with primary care outpt., referred to Dr. Ocasio for urgent f/u   pt. understands to return to ED if symptoms worsen; will d/c with clinda (pen allergic) and percocet for pain

## 2022-12-01 NOTE — ED PROVIDER NOTE - PHYSICAL EXAMINATION
Vitals: tachy at 113, otherwise WNL  Gen: AAOx3, NAD, sitting comfortably in stretcher  Head: ncat, perrla, eomi b/l  Neck: supple, no lymphadenopathy, no midline deviation  Heart: rrr, no m/r/g  Lungs: CTA b/l, no rales/ronchi/wheezes  Abd: soft, nontender, non-distended, no rebound or guarding  Ext: no clubbing/cyanosis/edema, distal fingertip avulsion of L 4th digit, includin nail and nailbed, distal phalange avulsed mid-nail, finger pulp/bone/nail bed visible   Neuro: sensation and muscle strength intact b/l, steady gait

## 2022-12-01 NOTE — ED PROVIDER NOTE - CARE PROVIDER_API CALL
Kaleigh Ocasio (MD)  Plastic Surgery  [address], [address]  [place], [place] [id number]  Phone: [phone]  Fax: [phone]  Follow Up Time: Urgent

## 2022-12-02 VITALS
OXYGEN SATURATION: 96 % | TEMPERATURE: 98 F | HEART RATE: 104 BPM | DIASTOLIC BLOOD PRESSURE: 70 MMHG | RESPIRATION RATE: 17 BRPM | SYSTOLIC BLOOD PRESSURE: 103 MMHG

## 2022-12-02 LAB
ALBUMIN SERPL ELPH-MCNC: 3.9 G/DL — SIGNIFICANT CHANGE UP (ref 3.3–5)
ALP SERPL-CCNC: 104 U/L — SIGNIFICANT CHANGE UP (ref 40–120)
ALT FLD-CCNC: 144 U/L — HIGH (ref 12–78)
ANION GAP SERPL CALC-SCNC: 10 MMOL/L — SIGNIFICANT CHANGE UP (ref 5–17)
APTT BLD: 35 SEC — SIGNIFICANT CHANGE UP (ref 27.5–35.5)
AST SERPL-CCNC: 173 U/L — HIGH (ref 15–37)
BASOPHILS # BLD AUTO: 0.01 K/UL — SIGNIFICANT CHANGE UP (ref 0–0.2)
BASOPHILS NFR BLD AUTO: 0.2 % — SIGNIFICANT CHANGE UP (ref 0–2)
BILIRUB SERPL-MCNC: 0.7 MG/DL — SIGNIFICANT CHANGE UP (ref 0.2–1.2)
BLD GP AB SCN SERPL QL: SIGNIFICANT CHANGE UP
BUN SERPL-MCNC: 9 MG/DL — SIGNIFICANT CHANGE UP (ref 7–23)
CALCIUM SERPL-MCNC: 8.5 MG/DL — SIGNIFICANT CHANGE UP (ref 8.5–10.1)
CHLORIDE SERPL-SCNC: 103 MMOL/L — SIGNIFICANT CHANGE UP (ref 96–108)
CO2 SERPL-SCNC: 26 MMOL/L — SIGNIFICANT CHANGE UP (ref 22–31)
CREAT SERPL-MCNC: 0.9 MG/DL — SIGNIFICANT CHANGE UP (ref 0.5–1.3)
EGFR: 115 ML/MIN/1.73M2 — SIGNIFICANT CHANGE UP
EOSINOPHIL # BLD AUTO: 0.05 K/UL — SIGNIFICANT CHANGE UP (ref 0–0.5)
EOSINOPHIL NFR BLD AUTO: 0.8 % — SIGNIFICANT CHANGE UP (ref 0–6)
GLUCOSE SERPL-MCNC: 117 MG/DL — HIGH (ref 70–99)
HCT VFR BLD CALC: 38.3 % — LOW (ref 39–50)
HGB BLD-MCNC: 12.1 G/DL — LOW (ref 13–17)
IMM GRANULOCYTES NFR BLD AUTO: 0.2 % — SIGNIFICANT CHANGE UP (ref 0–0.9)
INR BLD: 1.16 RATIO — SIGNIFICANT CHANGE UP (ref 0.88–1.16)
LYMPHOCYTES # BLD AUTO: 2.61 K/UL — SIGNIFICANT CHANGE UP (ref 1–3.3)
LYMPHOCYTES # BLD AUTO: 42.1 % — SIGNIFICANT CHANGE UP (ref 13–44)
MCHC RBC-ENTMCNC: 25.7 PG — LOW (ref 27–34)
MCHC RBC-ENTMCNC: 31.6 G/DL — LOW (ref 32–36)
MCV RBC AUTO: 81.3 FL — SIGNIFICANT CHANGE UP (ref 80–100)
MONOCYTES # BLD AUTO: 0.44 K/UL — SIGNIFICANT CHANGE UP (ref 0–0.9)
MONOCYTES NFR BLD AUTO: 7.1 % — SIGNIFICANT CHANGE UP (ref 2–14)
NEUTROPHILS # BLD AUTO: 3.08 K/UL — SIGNIFICANT CHANGE UP (ref 1.8–7.4)
NEUTROPHILS NFR BLD AUTO: 49.6 % — SIGNIFICANT CHANGE UP (ref 43–77)
NRBC # BLD: 0 /100 WBCS — SIGNIFICANT CHANGE UP (ref 0–0)
PLATELET # BLD AUTO: 292 K/UL — SIGNIFICANT CHANGE UP (ref 150–400)
POTASSIUM SERPL-MCNC: 4.1 MMOL/L — SIGNIFICANT CHANGE UP (ref 3.5–5.3)
POTASSIUM SERPL-SCNC: 4.1 MMOL/L — SIGNIFICANT CHANGE UP (ref 3.5–5.3)
PROT SERPL-MCNC: 7.8 GM/DL — SIGNIFICANT CHANGE UP (ref 6–8.3)
PROTHROM AB SERPL-ACNC: 13.9 SEC — HIGH (ref 10.5–13.4)
RBC # BLD: 4.71 M/UL — SIGNIFICANT CHANGE UP (ref 4.2–5.8)
RBC # FLD: 13.9 % — SIGNIFICANT CHANGE UP (ref 10.3–14.5)
SODIUM SERPL-SCNC: 139 MMOL/L — SIGNIFICANT CHANGE UP (ref 135–145)
WBC # BLD: 6.2 K/UL — SIGNIFICANT CHANGE UP (ref 3.8–10.5)
WBC # FLD AUTO: 6.2 K/UL — SIGNIFICANT CHANGE UP (ref 3.8–10.5)

## 2022-12-02 PROCEDURE — 73130 X-RAY EXAM OF HAND: CPT | Mod: 26,LT

## 2022-12-02 RX ORDER — OXYCODONE AND ACETAMINOPHEN 5; 325 MG/1; MG/1
2 TABLET ORAL
Qty: 32 | Refills: 0
Start: 2022-12-02 | End: 2022-12-05

## 2022-12-02 RX ADMIN — TETANUS TOXOID, REDUCED DIPHTHERIA TOXOID AND ACELLULAR PERTUSSIS VACCINE, ADSORBED 0.5 MILLILITER(S): 5; 2.5; 8; 8; 2.5 SUSPENSION INTRAMUSCULAR at 00:49

## 2022-12-02 NOTE — ED ADULT NURSE NOTE - WOUND TYPE
Ms. Gurrola states that she has been having a dry, non productive cough, scratchy, sore throat, and post nasal drip mostly at night for the past 9 days.  Patient states that she has tried hot tea, honey, Claritin, and mucinex, but nothing helps.  Patient wants to know what should she do.   AVULSION

## 2022-12-02 NOTE — ED ADULT NURSE NOTE - OBJECTIVE STATEMENT
Pt presents with left 4th finger missing tip 10/10 pain. PT states he slammed his hand in a door and when he snatched his hand out the tip of his finger was missing. PT has no pass medical hx.

## 2022-12-02 NOTE — ED ADULT NURSE NOTE - CHPI ED NUR SYMPTOMS POS
Render Post-Care Instructions In Note?: no Detail Level: Detailed Post-Care Instructions: I reviewed with the patient in detail post-care instructions. Patient is to wear sunprotection, and avoid picking at any of the treated lesions. Pt may apply Vaseline to crusted or scabbing areas. Consent: The patient's consent was obtained including but not limited to risks of crusting, scabbing, blistering, scarring, darker or lighter pigmentary change, recurrence, incomplete removal and infection. Duration Of Freeze Thaw-Cycle (Seconds): 0 BLEEDING/BLEEDING AT SITE/PAIN

## 2022-12-02 NOTE — ED ADULT NURSE NOTE - ALCOHOL PRE SCREEN (AUDIT - C)
Statement Selected Cryotherapy Text: The wound bed was treated with cryotherapy after the biopsy was performed.

## 2022-12-08 ENCOUNTER — EMERGENCY (EMERGENCY)
Facility: HOSPITAL | Age: 32
LOS: 0 days | Discharge: HOME | End: 2022-12-08
Attending: STUDENT IN AN ORGANIZED HEALTH CARE EDUCATION/TRAINING PROGRAM

## 2022-12-08 VITALS
HEIGHT: 66 IN | SYSTOLIC BLOOD PRESSURE: 124 MMHG | WEIGHT: 175.05 LBS | DIASTOLIC BLOOD PRESSURE: 80 MMHG | HEART RATE: 104 BPM | OXYGEN SATURATION: 100 % | RESPIRATION RATE: 20 BRPM

## 2022-12-08 DIAGNOSIS — M79.645 PAIN IN LEFT FINGER(S): ICD-10-CM

## 2022-12-08 DIAGNOSIS — Z87.19 PERSONAL HISTORY OF OTHER DISEASES OF THE DIGESTIVE SYSTEM: ICD-10-CM
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
## 2022-12-08 DIAGNOSIS — G89.29 OTHER CHRONIC PAIN: ICD-10-CM

## 2022-12-08 DIAGNOSIS — M25.561 PAIN IN RIGHT KNEE: ICD-10-CM

## 2022-12-08 DIAGNOSIS — Z88.1 ALLERGY STATUS TO OTHER ANTIBIOTIC AGENTS STATUS: ICD-10-CM

## 2022-12-08 DIAGNOSIS — S60.945A UNSPECIFIED SUPERFICIAL INJURY OF LEFT RING FINGER, INITIAL ENCOUNTER: ICD-10-CM

## 2022-12-08 PROCEDURE — 99284 EMERGENCY DEPT VISIT MOD MDM: CPT

## 2022-12-08 RX ORDER — OXYCODONE HYDROCHLORIDE 5 MG/1
1 TABLET ORAL
Qty: 8 | Refills: 0
Start: 2022-12-08 | End: 2022-12-11

## 2022-12-08 NOTE — ED PROVIDER NOTE - PATIENT PORTAL LINK FT
You can access the FollowMyHealth Patient Portal offered by [facility] by registering at the following website: [email or web address]. By joining [employer]’s FollowMyHealth portal, you will also be able to view your health information using other applications (apps) compatible with our system.

## 2022-12-08 NOTE — ED PROVIDER NOTE - LOCATION
distal 4th L digit with sutures in place, distal tip darker in color, sensation intact but decreased. ROM intact at PIP, limited at MIP and DIP 2/2 pain. No purulent discharge./hand

## 2022-12-08 NOTE — ED PROVIDER NOTE - NS ED ROS FT
CONSTITUTIONAL: No fevers, chills, fatigue, dizziness, weakness, unexpected weight change  EYES: No double vision, blurry vision  ENT: No ear pain, nasal congestion, runny nose, sore throat  CV: No chest pain, palpitations  PULM: No cough, shortness of breath  GI: No abdominal pain, nausea, vomiting, diarrhea, constipation  : No dysuria, polyuria, hematuria  SKIN: No rashes, swelling  MSK: + L hand 4th digit pain  NEURO: No headache, paresthesias  PSYCHIATRIC: Denies suicidal, homicidal ideations. No auditory, visual, tactile hallucinations

## 2022-12-08 NOTE — ED PROVIDER NOTE - CLINICAL SUMMARY MEDICAL DECISION MAKING FREE TEXT BOX
33 yo M no PMH presenting for evaluation of L 4th digit wound. On 12/1/22, pt seen here after an avulsion of his L hand distal 4th digit. Had labs, XR done. Hand surgery was consulted, who recommended suturing distal fingertip back on. Pt sent home with oxycodone and abx (clinda) which he states he has been complaint with. States ran out of oxycodone on Tuesday and is here because of increasing pain. Has not seen hand surgery as outpatient but states he sent pictures of his hand to "the specialist". Denies f/c, drainage from finger, numbness/tingling, n/v. ROS as below. 31 yo M no PMH presenting for evaluation of L 4th digit wound after having L distal tip reattached after avulsion injury. On exam pt mildly tachy but in NAD. Distal pulses intact. Sensation at distal fingertip intact, but diminished per patient. States it has been discolored since 2 days after the injury. Denies drainage.   Discussed with Dr. Ocasio of hand surgery who states sutures to remain in place for another 10-14 days and pt should have them removed in hand surgery clinic/office and not in ED. States will take a few weeks before determining if distal fingertip will be viable so will give pain meds, focus on analgesia and infection prevention. To followup as outpatient.   Will give percocet here, reassess pain and dispo.

## 2022-12-08 NOTE — ED PROVIDER NOTE - PROGRESS NOTE DETAILS
Sent picture of pts finger to Dr. Ocasio who states discoloration to be expected. Recommends outpatient hand surgery followup. Informed patient of need to give finger time to see if it will be viable and that he needs to see hand surgery in clinic. Return precautions given. Will send additional pain meds. Stable for discharge. Given information for other hand surgeons as well in case pt with insurance issues.

## 2022-12-08 NOTE — ED PROVIDER NOTE - NSFOLLOWUPINSTRUCTIONS_ED_ALL_ED_FT
You were seen in the Emergency Department for pain in your left hand, 4th digit. Your pain was controlled with oral medication. A hand surgeon was contacted regarding your condition and recommends sutures being in place for another 1-2 weeks prior to being removed and states that a few weeks are needed before determining viability of your fingertip. Follow up with Dr. Ocasio or other hand surgeon as discussed during your ED visit. Continue taking your antibiotics as prescribed.     1) Advance activity as tolerated.   2) Continue all previously prescribed medications as directed.    3) Follow up with your primary care physician in 24-48 hours - take copies of your results.    4) Return to the Emergency Department for worsening or persistent symptoms, and/or ANY NEW OR CONCERNING SYMPTOMS.

## 2022-12-08 NOTE — ED ADULT NURSE NOTE - OBJECTIVE STATEMENT
Presented to ER aox3  c/o left finger injury  x7 days ago jammed in car door / open wound, with dark color discoloration and swollen.

## 2022-12-08 NOTE — ED PROVIDER NOTE - NSFOLLOWUPCLINICS_GEN_ALL_ED_FT
[facility]  Hand Surgery  [address]  [place] [address]  Phone: [phone]  Fax:

## 2022-12-08 NOTE — ED ADULT NURSE REASSESSMENT NOTE - NS ED NURSE REASSESS COMMENT FT1
Patient aox3 discharged home all discharge instructions given encouraged to follow up with MDs appointment / Left ER ambulatory in no acute distress.

## 2022-12-08 NOTE — ED PROVIDER NOTE - OBJECTIVE STATEMENT
33 yo M no PMH presenting for evaluation of L 4th digit wound. On 12/1/22, pt seen here after an avulsion of his L hand distal 4th digit. Had labs, XR done. Hand surgery was consulted, who recommended suturing distal fingertip back on. Pt sent home with oxycodone and abx (clinda) which he states he has been complaint with. States ran out of oxycodone on Tuesday and is here because of increasing pain. Has not seen hand surgery as outpatient but states he sent pictures of his hand to "the specialist". Denies f/c, drainage from finger, numbness/tingling, n/v. ROS as below.
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
## 2022-12-18 ENCOUNTER — EMERGENCY (EMERGENCY)
Facility: HOSPITAL | Age: 32
LOS: 0 days | Discharge: HOME | End: 2022-12-18
Attending: EMERGENCY MEDICINE

## 2022-12-18 VITALS
SYSTOLIC BLOOD PRESSURE: 125 MMHG | HEIGHT: 66 IN | HEART RATE: 83 BPM | DIASTOLIC BLOOD PRESSURE: 85 MMHG | TEMPERATURE: 98 F | WEIGHT: 179.9 LBS | OXYGEN SATURATION: 96 % | RESPIRATION RATE: 18 BRPM

## 2022-12-18 VITALS
HEART RATE: 66 BPM | OXYGEN SATURATION: 99 % | TEMPERATURE: 98 F | SYSTOLIC BLOOD PRESSURE: 132 MMHG | RESPIRATION RATE: 16 BRPM | DIASTOLIC BLOOD PRESSURE: 61 MMHG

## 2022-12-18 DIAGNOSIS — Z88.1 ALLERGY STATUS TO OTHER ANTIBIOTIC AGENTS STATUS: ICD-10-CM

## 2022-12-18 DIAGNOSIS — M79.645 PAIN IN LEFT FINGER(S): ICD-10-CM

## 2022-12-18 DIAGNOSIS — Z86.59 PERSONAL HISTORY OF OTHER MENTAL AND BEHAVIORAL DISORDERS: ICD-10-CM

## 2022-12-18 DIAGNOSIS — Z87.19 PERSONAL HISTORY OF OTHER DISEASES OF THE DIGESTIVE SYSTEM: ICD-10-CM

## 2022-12-18 DIAGNOSIS — I96 GANGRENE, NOT ELSEWHERE CLASSIFIED: ICD-10-CM

## 2022-12-18 PROCEDURE — 99284 EMERGENCY DEPT VISIT MOD MDM: CPT

## 2022-12-18 RX ORDER — TRAMADOL HYDROCHLORIDE 50 MG/1
50 TABLET ORAL ONCE
Refills: 0 | Status: DISCONTINUED | OUTPATIENT
Start: 2022-12-18 | End: 2022-12-18

## 2022-12-18 RX ORDER — TRAMADOL HYDROCHLORIDE 50 MG/1
1 TABLET ORAL
Qty: 12 | Refills: 0
Start: 2022-12-18 | End: 2022-12-20

## 2022-12-18 RX ORDER — ACETAMINOPHEN 500 MG
2 TABLET ORAL
Qty: 56 | Refills: 0
Start: 2022-12-18 | End: 2022-12-24

## 2022-12-18 RX ADMIN — Medication 1 TABLET(S): at 15:39

## 2022-12-18 RX ADMIN — TRAMADOL HYDROCHLORIDE 50 MILLIGRAM(S): 50 TABLET ORAL at 15:32

## 2022-12-18 NOTE — ED ADULT TRIAGE NOTE - CHIEF COMPLAINT QUOTE
pt presents to the ED with left 4th digit necrotic distal tip of finger, states he had reimplantation of finger tip 2 weeks ago and has been black in color since did not follow up +Nausea

## 2022-12-18 NOTE — ED PROVIDER NOTE - CARE PROVIDER_API CALL
Daniel Salcido  Plastic Surgery  [address] [address]  Phone: [phone]  Fax: [phone]  Follow Up Time: 1-3 Days

## 2022-12-18 NOTE — ED PROVIDER NOTE - NSFOLLOWUPINSTRUCTIONS_ED_ALL_ED_FT
Gangrene    WHAT YOU NEED TO KNOW:    What is gangrene? Gangrene is a condition that happens when tissue dies. Gangrene may be caused by conditions that stop blood flow, or a bacterial infection. Blood flow to tissues may be stopped by a blood clot or narrowed blood vessels. Bacteria may get into tissue through an incision or a wound, such as a burn, gunshot, or ulcer. Gangrene most commonly affects the hand, fingers, arm, foot, toes, or leg. Gangrene is a life-threatening condition that needs immediate treatment.     What increases my risk for gangrene?   •Diabetes      •Peripheral artery disease      •Conditions that cause your blood to clot easily, such as Factor V Leiden      •Conditions that cause a weak immune system, such as HIV      •Severe frostbite      •IV drug abuse      •Cancer      What are the signs and symptoms of gangrene? Gangrene usually begins with fever, pain, and swelling. The pain and swelling usually get worse quickly. The area of dead tissue may look brown, red, purple, or black. It may drain red or yellow fluid that smells bad. You may have a fast heartbeat and feel weak or dizzy. If the infection spreads, you may be confused or lose consciousness.     How is gangrene diagnosed and treated? Your healthcare provider will examine the area and take a sample of tissue. The tissue will be tested for bacteria. A blood sample may also be collected and tested for bacteria.     How is gangrene treated? Gangrene is treated with surgery to remove the dead tissue. In severe cases, part of your arm or leg may also be removed. You may be given antibiotics to treat an infection. You will be given medicine to decrease pain and fever. Conditions that cause poor blood flow will also be treated. After surgery, you may need hyperbaric oxygen (HBO) therapy. HBO therapy helps the tissue heal by increasing blood flow and preventing the infection from getting worse. If the infection spreads to the rest of your body, you may need other treatments.     How can I help prevent gangrene?   •Care for all wounds and incisions as directed. Always wash your hands before and after you touch your wound or incision. Use soap and water or an alcohol-based hand rub. Carefully wash around the wound or incision with soap and water. Dry the area and put on new, clean bandages as directed. Change your bandages when they get wet or dirty. Call your healthcare provider if your wound or incision becomes red, swollen, or drains pus. Early treatment can help prevent gangrene.       •Manage other health conditions. This includes diabetes, peripheral artery disease, and problems with blood clotting. These conditions can increase your risk for wound infections.       •Seek care immediately for symptoms of a blood clot or poor blood flow. Symptoms of a blood clot include an arm or leg that feels warm, tender, and painful. It may look swollen and red. Symptoms of poor blood flow include an arm or leg that is cold, pale, or numb.       What can I do to care for myself?   •Elevate your arm or leg above the level of your heart as often as you can. This will help decrease swelling and pain. Prop your arm or leg on pillows or blankets to keep it elevated comfortably.       •Rest as directed. Do not return to your usual activities until healthcare provider says it is okay. It may take several weeks for you to recover.       •Eat a variety of healthy foods. Healthy foods will help you heal. Healthy foods include fruits, vegetables, whole-grain breads, low-fat dairy products, beans, lean meats, and fish. Ask if you need to be on a special diet.       When should I seek immediate care?   •Your symptoms return or you have new symptoms.           When should I contact my healthcare provider?   •You have questions or concerns about your condition or care.          CARE AGREEMENT:    You have the right to help plan your care. Learn about your health condition and how it may be treated. Discuss treatment options with your healthcare providers to decide what care you want to receive. You always have the right to refuse treatment.

## 2022-12-18 NOTE — ED PROVIDER NOTE - OBJECTIVE STATEMENT
32 year old male with no significant PMH presenting to ED due to fingertip injury - states that he had sutures placed on 12/8 but since that time has not been able to follow outpatient for issue as his insurance is not accepted. Pt states he otherwise has not had fever/chills - left 4th digit with black discoloration and occasional pain on movement or touch of finger. States otherwise injury occurred from crush injury by door - since that time has not had finger swelling, no redness to area and no fever/chills.

## 2022-12-18 NOTE — ED PROVIDER NOTE - PATIENT PORTAL LINK FT
You can access the FollowMyHealth Patient Portal offered by [facility] by registering at the following website: [email or web address]. By joining [employer]’s FollowMyHealth portal, you will also be able to view your health information using other applications (apps) compatible with our system.

## 2022-12-18 NOTE — ED ADULT NURSE NOTE - OBJECTIVE STATEMENT
32m presents to to the ED WITH left fourth digit pain, tenderness and necrosis to tip of finger, patient states that the consult was $500 and did not see the specialist that was given to the patient

## 2022-12-18 NOTE — ED PROVIDER NOTE - MUSCULOSKELETAL MINIMAL EXAM
4th left digit with dry gangrene noted at tip, by nailbed coloration is normal with <2 sec cap refill but noted on volar tip of finger gangrene from sutured area distally extending. No erythema or swelling around base however, ROM of PIP/DIP intact

## 2022-12-18 NOTE — ED PROVIDER NOTE - CLINICAL SUMMARY MEDICAL DECISION MAKING FREE TEXT BOX
discussed case with Dr Ocasio - states pt may follow up outpt and be discharged on oral abx. Gangrenous tissue may be removed in office as needed but there may be healthy tissue  growing beneath. No resection or debridement to be done in ED. Will dc won Bactrim and as per pt wanting some medication for pain - will dc with tramadol as needed for pain. Pt states will not follow up with Dr Ocasio due to insurance cost as pt used [employer] and the office does not take that insurance. After a brief search through [employer] database noted a hand surgeon close to patient who does take [employer]- Dr Salcdio - will dc with follow up with Dr Salcido.

## 2023-04-09 ENCOUNTER — EMERGENCY (EMERGENCY)
Facility: HOSPITAL | Age: 33
LOS: 1 days | Discharge: HOME | End: 2023-04-09
Admitting: STUDENT IN AN ORGANIZED HEALTH CARE EDUCATION/TRAINING PROGRAM
Payer: MEDICAID

## 2023-04-09 VITALS
HEART RATE: 100 BPM | SYSTOLIC BLOOD PRESSURE: 155 MMHG | TEMPERATURE: 98 F | RESPIRATION RATE: 20 BRPM | DIASTOLIC BLOOD PRESSURE: 95 MMHG | OXYGEN SATURATION: 100 %

## 2023-04-09 LAB
APAP SERPL-MCNC: <10 UG/ML — LOW (ref 15–25)
BASOPHILS # BLD AUTO: 0.06 K/UL — SIGNIFICANT CHANGE UP (ref 0–0.2)
BASOPHILS NFR BLD AUTO: 0.6 % — SIGNIFICANT CHANGE UP (ref 0–2)
EOSINOPHIL # BLD AUTO: 0.08 K/UL — SIGNIFICANT CHANGE UP (ref 0–0.5)
EOSINOPHIL NFR BLD AUTO: 0.8 % — SIGNIFICANT CHANGE UP (ref 0–6)
ETHANOL SERPL-MCNC: <10 MG/DL — SIGNIFICANT CHANGE UP
HCT VFR BLD CALC: 40.3 % — SIGNIFICANT CHANGE UP (ref 39–50)
HGB BLD-MCNC: 13 G/DL — SIGNIFICANT CHANGE UP (ref 13–17)
IANC: 6.09 K/UL — SIGNIFICANT CHANGE UP (ref 1.8–7.4)
IMM GRANULOCYTES NFR BLD AUTO: 0.3 % — SIGNIFICANT CHANGE UP (ref 0–0.9)
LYMPHOCYTES # BLD AUTO: 29.8 % — SIGNIFICANT CHANGE UP (ref 13–44)
LYMPHOCYTES # BLD AUTO: 3.03 K/UL — SIGNIFICANT CHANGE UP (ref 1–3.3)
MCHC RBC-ENTMCNC: 25.9 PG — LOW (ref 27–34)
MCHC RBC-ENTMCNC: 32.3 GM/DL — SIGNIFICANT CHANGE UP (ref 32–36)
MCV RBC AUTO: 80.3 FL — SIGNIFICANT CHANGE UP (ref 80–100)
MONOCYTES # BLD AUTO: 0.88 K/UL — SIGNIFICANT CHANGE UP (ref 0–0.9)
MONOCYTES NFR BLD AUTO: 8.7 % — SIGNIFICANT CHANGE UP (ref 2–14)
NEUTROPHILS # BLD AUTO: 6.09 K/UL — SIGNIFICANT CHANGE UP (ref 1.8–7.4)
NEUTROPHILS NFR BLD AUTO: 59.8 % — SIGNIFICANT CHANGE UP (ref 43–77)
NRBC # BLD: 0 /100 WBCS — SIGNIFICANT CHANGE UP (ref 0–0)
NRBC # FLD: 0.02 K/UL — HIGH (ref 0–0)
PLATELET # BLD AUTO: 306 K/UL — SIGNIFICANT CHANGE UP (ref 150–400)
RBC # BLD: 5.02 M/UL — SIGNIFICANT CHANGE UP (ref 4.2–5.8)
RBC # FLD: 14.3 % — SIGNIFICANT CHANGE UP (ref 10.3–14.5)
SALICYLATES SERPL-MCNC: <0.3 MG/DL — LOW (ref 15–30)
TOXICOLOGY SCREEN, DRUGS OF ABUSE, SERUM RESULT: SIGNIFICANT CHANGE UP
TROPONIN T, HIGH SENSITIVITY RESULT: <6 NG/L — SIGNIFICANT CHANGE UP
WBC # BLD: 10.17 K/UL — SIGNIFICANT CHANGE UP (ref 3.8–10.5)
WBC # FLD AUTO: 10.17 K/UL — SIGNIFICANT CHANGE UP (ref 3.8–10.5)

## 2023-04-09 PROCEDURE — 99285 EMERGENCY DEPT VISIT HI MDM: CPT

## 2023-04-09 PROCEDURE — 93010 ELECTROCARDIOGRAM REPORT: CPT

## 2023-04-09 PROCEDURE — 71046 X-RAY EXAM CHEST 2 VIEWS: CPT | Mod: 26

## 2023-04-09 RX ORDER — FAMOTIDINE 10 MG/ML
20 INJECTION INTRAVENOUS ONCE
Refills: 0 | Status: COMPLETED | OUTPATIENT
Start: 2023-04-09 | End: 2023-04-09

## 2023-04-09 RX ORDER — SODIUM CHLORIDE 9 MG/ML
1000 INJECTION INTRAMUSCULAR; INTRAVENOUS; SUBCUTANEOUS ONCE
Refills: 0 | Status: COMPLETED | OUTPATIENT
Start: 2023-04-09 | End: 2023-04-09

## 2023-04-09 RX ORDER — KETOROLAC TROMETHAMINE 30 MG/ML
30 SYRINGE (ML) INJECTION ONCE
Refills: 0 | Status: DISCONTINUED | OUTPATIENT
Start: 2023-04-09 | End: 2023-04-09

## 2023-04-09 RX ADMIN — SODIUM CHLORIDE 1000 MILLILITER(S): 9 INJECTION INTRAMUSCULAR; INTRAVENOUS; SUBCUTANEOUS at 21:56

## 2023-04-09 RX ADMIN — Medication 30 MILLIGRAM(S): at 21:56

## 2023-04-09 RX ADMIN — FAMOTIDINE 20 MILLIGRAM(S): 10 INJECTION INTRAVENOUS at 21:56

## 2023-04-09 RX ADMIN — Medication 50 MILLIGRAM(S): at 21:56

## 2023-04-09 RX ADMIN — Medication 30 MILLILITER(S): at 21:56

## 2023-04-09 NOTE — ED ADULT NURSE NOTE - NSIMPLEMENTINTERV_GEN_ALL_ED
Implemented All Fall with Harm Risk Interventions:  [place] to call system. Call bell, personal items and telephone within reach. Instruct patient to call for assistance. Room bathroom lighting operational. Non-slip footwear when patient is off stretcher. Physically safe environment: no spills, clutter or unnecessary equipment. Stretcher in lowest position, wheels locked, appropriate side rails in place. Provide visual cue, wrist band, yellow gown, etc. Monitor gait and stability. Monitor for mental status changes and reorient to person, place, and time. Review medications for side effects contributing to fall risk. Reinforce activity limits and safety measures with patient and family. Provide visual clues: red socks.

## 2023-04-09 NOTE — ED ADULT NURSE NOTE - OBJECTIVE STATEMENT
Received patient to intake 10B, A&OX4, ambulatory. Pt. c/o chest pain and burning abdominal discomfort x5 years. endorses PHx alcohol abuse. denies nausea or vomiting, Respirations even and unlabored. 20 G IV established to R Hand. Labs drawn and sent. Will continue to monitor.

## 2023-04-09 NOTE — ED PROVIDER NOTE - CLINICAL SUMMARY MEDICAL DECISION MAKING FREE TEXT BOX
33-year-old male past medical history of daily alcohol user presenting with a complaint of left-sided chest pain and abdominal pain.  Endorses intermittent symptoms x5 years that have increased in intensity today.  On presentation patient well-appearing, vital stable, EKG sinus tach without any ischemic changes or abnormal arrhythmias, or acute signs of withdrawal.   Symptoms likely due to alcoholic induced gastritis, pancreatitis, rule out ACS event.  Plan for routine labs including lipase, troponin, chest x-ray will treat with antiacid, Maalox, analgesics.

## 2023-04-09 NOTE — ED PROVIDER NOTE - OBJECTIVE STATEMENT
Patient is a 33-year-old male, past medical history of alcohol abuse, no other medical history presenting with complaint of chest pain associated with burning abdominal pain x5 years that has increased in intensity times today.  Patient states that he has never seek medical attention for symptoms, generally allow symptoms to resolve on their own.  Does endorse that symptoms are provoked when he stops drinking.  His last drink was 1 day ago.  No associated shortness of breath, nausea/vomiting, headache/dizziness, melena, hematemesis, palpitations

## 2023-04-09 NOTE — ED PROVIDER NOTE - PROGRESS NOTE DETAILS
STEFANO Barryt: Is labs reviewed, patient informed of elevated LFTs likely due to excessive alcohol use.  Patient provided information to detox center.  Patient without clinical signs of withdrawal.  He is clinically stable for discharge

## 2023-04-09 NOTE — ED ADULT TRIAGE NOTE - CHIEF COMPLAINT QUOTE
Pt c/o of worsening chest pain x 2-3 years. Endorses back pain, sob. Denies  fever, chills, heavy lifting, headache . No PMHx. Pt c/o worsening chest pain x 2-3 years. Endorses back pain, sob. Denies  fever, chills, heavy lifting, headache . No PMHx.

## 2023-04-09 NOTE — ED ADULT NURSE NOTE - CHIEF COMPLAINT QUOTE
Pt c/o worsening chest pain x 2-3 years. Endorses back pain, sob. Denies  fever, chills, heavy lifting, headache . No PMHx.

## 2023-04-09 NOTE — ED PROVIDER NOTE - PATIENT PORTAL LINK FT
You can access the FollowMyHealth Patient Portal offered by [facility] by registering at the following website: [email or web address]. By joining [employer]’s FollowMyHealth portal, you will also be able to view your health information using other applications (apps) compatible with our system.

## 2023-04-10 LAB
ALBUMIN SERPL ELPH-MCNC: 4.9 G/DL — SIGNIFICANT CHANGE UP (ref 3.3–5)
ALP SERPL-CCNC: 145 U/L — HIGH (ref 40–120)
ALT FLD-CCNC: 139 U/L — HIGH (ref 4–41)
ANION GAP SERPL CALC-SCNC: 20 MMOL/L — HIGH (ref 7–14)
AST SERPL-CCNC: 310 U/L — HIGH (ref 4–40)
BILIRUB SERPL-MCNC: 0.2 MG/DL — SIGNIFICANT CHANGE UP (ref 0.2–1.2)
BUN SERPL-MCNC: 16 MG/DL — SIGNIFICANT CHANGE UP (ref 7–23)
CALCIUM SERPL-MCNC: 9.2 MG/DL — SIGNIFICANT CHANGE UP (ref 8.4–10.5)
CHLORIDE SERPL-SCNC: 94 MMOL/L — LOW (ref 98–107)
CO2 SERPL-SCNC: 20 MMOL/L — LOW (ref 22–31)
CREAT SERPL-MCNC: 0.74 MG/DL — SIGNIFICANT CHANGE UP (ref 0.5–1.3)
EGFR: 121 ML/MIN/1.73M2 — SIGNIFICANT CHANGE UP
GLUCOSE SERPL-MCNC: 101 MG/DL — HIGH (ref 70–99)
LIDOCAIN IGE QN: 74 U/L — HIGH (ref 7–60)
MAGNESIUM SERPL-MCNC: 1.5 MG/DL — LOW (ref 1.6–2.6)
POTASSIUM SERPL-MCNC: 4.3 MMOL/L — SIGNIFICANT CHANGE UP (ref 3.5–5.3)
POTASSIUM SERPL-SCNC: 4.3 MMOL/L — SIGNIFICANT CHANGE UP (ref 3.5–5.3)
PROT SERPL-MCNC: 8.1 G/DL — SIGNIFICANT CHANGE UP (ref 6–8.3)
SODIUM SERPL-SCNC: 134 MMOL/L — LOW (ref 135–145)

## 2023-04-10 RX ORDER — FAMOTIDINE 10 MG/ML
1 INJECTION INTRAVENOUS
Qty: 30 | Refills: 0
Start: 2023-04-10 | End: 2023-04-24

## 2023-10-31 NOTE — ED ADULT NURSE NOTE - ISOLATION TYPE:
10/31/23 1440   [facility] Group Therapy   Group Name Therapeutic Recreation   Specific Interventions Coping Skills Training   Participation Level Appropriate;Attentive   Participation Quality Cooperative;Social   Insight/Motivation Applies New Skills;Good   Affect/Mood Display Appropriate   Cognition Alert   Psychomotor WNL     Patient presents calm, cooperative, motivated to participate, reports good mood. Patient shows good concentration, friendly competition, made independent decisions, remained involved and enjoyed the activity.   None

## 2024-06-04 ENCOUNTER — EMERGENCY (EMERGENCY)
Facility: HOSPITAL | Age: 34
LOS: 1 days | Discharge: HOME | End: 2024-06-04
Admitting: STUDENT IN AN ORGANIZED HEALTH CARE EDUCATION/TRAINING PROGRAM
Payer: MEDICAID

## 2024-06-04 VITALS
TEMPERATURE: 98 F | HEART RATE: 100 BPM | OXYGEN SATURATION: 97 % | SYSTOLIC BLOOD PRESSURE: 122 MMHG | DIASTOLIC BLOOD PRESSURE: 78 MMHG | RESPIRATION RATE: 18 BRPM

## 2024-06-04 VITALS
RESPIRATION RATE: 16 BRPM | HEART RATE: 86 BPM | SYSTOLIC BLOOD PRESSURE: 144 MMHG | DIASTOLIC BLOOD PRESSURE: 95 MMHG | OXYGEN SATURATION: 100 % | TEMPERATURE: 98 F

## 2024-06-04 PROCEDURE — 99284 EMERGENCY DEPT VISIT MOD MDM: CPT

## 2024-06-04 PROCEDURE — 73080 X-RAY EXAM OF ELBOW: CPT | Mod: 26,LT

## 2024-06-04 RX ORDER — IBUPROFEN 200 MG
600 TABLET ORAL ONCE
Refills: 0 | Status: COMPLETED | OUTPATIENT
Start: 2024-06-04 | End: 2024-06-04

## 2024-06-04 RX ORDER — IBUPROFEN 200 MG
1 TABLET ORAL
Qty: 15 | Refills: 0
Start: 2024-06-04 | End: 2024-06-08

## 2024-06-04 RX ADMIN — Medication 600 MILLIGRAM(S): at 21:21
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
## 2024-06-04 NOTE — ED PROVIDER NOTE - PATIENT PORTAL LINK FT
You can access the FollowMyHealth Patient Portal offered by [facility] by registering at the following website: [email or web address]. By joining [employer]’s FollowMyHealth portal, you will also be able to view your health information using other applications (apps) compatible with our system.

## 2024-06-25 NOTE — ED PROVIDER NOTE - EYES, MLM
verbal cues/nonverbal cues (demo/gestures)/1 person assist
Clear bilaterally, pupils equal, round and reactive to light. EOMI.

## 2024-06-28 ENCOUNTER — EMERGENCY (EMERGENCY)
Facility: HOSPITAL | Age: 34
LOS: 1 days | Discharge: HOME | End: 2024-06-28
Attending: EMERGENCY MEDICINE | Admitting: EMERGENCY MEDICINE
Payer: MEDICAID

## 2024-06-28 VITALS
SYSTOLIC BLOOD PRESSURE: 106 MMHG | RESPIRATION RATE: 16 BRPM | TEMPERATURE: 98 F | DIASTOLIC BLOOD PRESSURE: 78 MMHG | OXYGEN SATURATION: 100 % | HEART RATE: 93 BPM

## 2024-06-28 PROCEDURE — 99283 EMERGENCY DEPT VISIT LOW MDM: CPT

## 2024-06-28 RX ORDER — LIDOCAINE HYDROCHLORIDE,EPINEPHRINE BITARTRATE 15; .005 MG/ML; MG/ML
10 INJECTION, SOLUTION EPIDURAL; INFILTRATION; INTRACAUDAL; PERINEURAL ONCE
Refills: 0 | Status: DISCONTINUED | OUTPATIENT
Start: 2024-06-28 | End: 2024-07-02

## 2024-06-28 RX ORDER — CLINDAMYCIN PHOSPHATE 150 MG/ML
300 INJECTION, SOLUTION INTRAVENOUS ONCE
Refills: 0 | Status: DISCONTINUED | OUTPATIENT
Start: 2024-06-28 | End: 2024-07-02

## 2024-06-28 RX ORDER — ACETAMINOPHEN 325 MG
650 TABLET ORAL ONCE
Refills: 0 | Status: DISCONTINUED | OUTPATIENT
Start: 2024-06-28 | End: 2024-07-02

## 2024-06-28 RX ORDER — CLINDAMYCIN PHOSPHATE 150 MG/ML
1 INJECTION, SOLUTION INTRAVENOUS
Qty: 21 | Refills: 0
Start: 2024-06-28 | End: 2024-07-04